# Patient Record
Sex: MALE | Race: WHITE | NOT HISPANIC OR LATINO | Employment: OTHER | URBAN - METROPOLITAN AREA
[De-identification: names, ages, dates, MRNs, and addresses within clinical notes are randomized per-mention and may not be internally consistent; named-entity substitution may affect disease eponyms.]

---

## 2018-02-28 ENCOUNTER — HOSPITAL ENCOUNTER (OUTPATIENT)
Dept: RADIOLOGY | Facility: HOSPITAL | Age: 73
Discharge: HOME | End: 2018-02-28
Attending: ORTHOPAEDIC SURGERY
Payer: MEDICARE

## 2018-02-28 ENCOUNTER — OFFICE VISIT (OUTPATIENT)
Dept: SPORTS MEDICINE | Facility: CLINIC | Age: 73
End: 2018-02-28
Payer: MEDICARE

## 2018-02-28 VITALS
HEART RATE: 68 BPM | HEIGHT: 75 IN | SYSTOLIC BLOOD PRESSURE: 120 MMHG | BODY MASS INDEX: 35.31 KG/M2 | WEIGHT: 284 LBS | DIASTOLIC BLOOD PRESSURE: 84 MMHG

## 2018-02-28 DIAGNOSIS — M25.562 PAIN IN BOTH KNEES, UNSPECIFIED CHRONICITY: ICD-10-CM

## 2018-02-28 DIAGNOSIS — M17.0 PRIMARY OSTEOARTHRITIS OF KNEES, BILATERAL: ICD-10-CM

## 2018-02-28 DIAGNOSIS — M25.561 PAIN IN BOTH KNEES, UNSPECIFIED CHRONICITY: Primary | ICD-10-CM

## 2018-02-28 DIAGNOSIS — M25.562 PAIN IN BOTH KNEES, UNSPECIFIED CHRONICITY: Primary | ICD-10-CM

## 2018-02-28 DIAGNOSIS — M25.561 PAIN IN BOTH KNEES, UNSPECIFIED CHRONICITY: ICD-10-CM

## 2018-02-28 PROCEDURE — 99999 PR PBB SHADOW E&M-NEW PATIENT-LVL III: CPT | Mod: PBBFAC,,, | Performed by: ORTHOPAEDIC SURGERY

## 2018-02-28 PROCEDURE — 99203 OFFICE O/P NEW LOW 30 MIN: CPT | Mod: PBBFAC,25,PO | Performed by: ORTHOPAEDIC SURGERY

## 2018-02-28 PROCEDURE — 73564 X-RAY EXAM KNEE 4 OR MORE: CPT | Mod: 26,50,, | Performed by: RADIOLOGY

## 2018-02-28 PROCEDURE — 97760 ORTHOTIC MGMT&TRAING 1ST ENC: CPT | Mod: PBBFAC,PO | Performed by: ORTHOPAEDIC SURGERY

## 2018-02-28 PROCEDURE — 99204 OFFICE O/P NEW MOD 45 MIN: CPT | Mod: S$PBB,,, | Performed by: ORTHOPAEDIC SURGERY

## 2018-02-28 PROCEDURE — 73564 X-RAY EXAM KNEE 4 OR MORE: CPT | Mod: TC,50,FY,PO

## 2018-02-28 RX ORDER — ETODOLAC 400 MG/1
400 TABLET, FILM COATED ORAL 2 TIMES DAILY
COMMUNITY
End: 2020-02-14

## 2018-02-28 RX ORDER — FLUOXETINE HYDROCHLORIDE 20 MG/1
20 CAPSULE ORAL DAILY
COMMUNITY

## 2018-02-28 RX ORDER — GLUCOSAMINE/CHONDRO SU A 500-400 MG
1 TABLET ORAL 3 TIMES DAILY
COMMUNITY

## 2018-02-28 RX ORDER — ATORVASTATIN CALCIUM 10 MG/1
10 TABLET, FILM COATED ORAL DAILY
COMMUNITY

## 2018-02-28 RX ORDER — OMEPRAZOLE 20 MG/1
20 CAPSULE, DELAYED RELEASE ORAL DAILY
COMMUNITY
End: 2022-10-03

## 2018-02-28 RX ORDER — HYDROCHLOROTHIAZIDE 12.5 MG/1
12.5 CAPSULE ORAL DAILY
COMMUNITY

## 2018-02-28 RX ORDER — AZELASTINE 1 MG/ML
1 SPRAY, METERED NASAL 2 TIMES DAILY
COMMUNITY

## 2018-02-28 RX ORDER — LANOLIN ALCOHOL/MO/W.PET/CERES
1 CREAM (GRAM) TOPICAL 2 TIMES DAILY
COMMUNITY

## 2018-02-28 RX ORDER — ACETAMINOPHEN, DIPHENHYDRAMINE HCL, PHENYLEPHRINE HCL 325; 25; 5 MG/1; MG/1; MG/1
TABLET ORAL
COMMUNITY

## 2018-02-28 NOTE — PATIENT INSTRUCTIONS
DESCRIPTION  Synvisc-One (hylan G-F 20) is an elastoviscous high molecular weight fluid containing hylan A and hylan B polymers produced from chicken galdamez. Hylans are derivatives of hyaluronan (sodium hyaluronate). Hylan G-F 20 is unique in that the hyaluronan is chemically cross linked. Hyaluronan is a long-chain polymer containing repeating disaccharide units of Qu-szgqsuyxenq-S-acetylglucosamine.     INDICATIONS FOR USE  Synvisc-One is indicated for the treatment of pain in osteoarthritis (OA) of the knee in patients who have failed to respond adequately to conservative nonpharmacologic therapy and simple analgesics, e.g., acetaminophen.     Who is a candidate for Synvisc-One  Patients with knee osteoarthritis, who have tried diet, exercise and over-the-counter pain medication but still have pain, should talk to their doctor to see if Synvisc-One is right for them.     How Synvisc-One is administered  Synvisc-One is a single injection. It's a simple, in-office procedure that only takes a few minutes.     What you can expect following a Synvisc-One knee injection Synvisc-One can provide up to six months of osteoarthritis knee pain relief. Everyone responds differently, but in a medical study* patients experienced relief starting one month after the injection.     After the injection, you can resume normal day-to-day activities, but you should avoid any strenuous activities for about 48 hours.     Contraindication  Do not administer to patients with known hypersensitivity (allergy) to hyaluronan (sodium hyaluronate) preparations.   Do not inject Synvisc-One in the knees of patients having knee joint infections or skin diseases or infections in the area of theinjection site.    What are possible side effects?  Synvisc may occur short-term pain, swelling at the injection site and / or the appearance of synovial exudate after injection. In some cases, exudation may be significant and cause more prolonged pain.      Important Safety Information  Before trying Synvisc-One or SYNVISC, tell your doctor if you are allergic to products from birds - such as feathers, eggs or poultry - or if your leg is swollen or infected. Synvisc-One and SYNVISC are only for injection into the knee, performed by a doctor or other qualified health care professional. Synvisc-One and SYNVISC have not been tested to show pain relief in joints other than the knee. Talk to your doctor before resuming strenuous weight-bearing activities after treatment. Synvisc-One and SYNVISC have not been tested in children, pregnant women or women who are nursing. You should tell your doctor if you think you are pregnant or if you are nursing a child. The side effects most commonly seen when Synvisc-One or SYNVISC is injected into the knee were pain, swelling and/or fluid buildup in or around the knee. Cases where the swelling is extensive or painful should be discussed with your doctor. Allergic reactions such as rash and hives have been reported rarely.

## 2018-02-28 NOTE — PROGRESS NOTES
Subjective:          Chief Complaint: Carlos Alberto Modi is a 73 y.o. male who had concerns including Pain of the Right Knee and Pain of the Left Knee.    Pt reports bilateral knee pain for many years, worse recently.  Steroid injection in right knee last week of January and left knee first week of February.             Review of Systems   Constitution: Negative for fever and night sweats.   HENT: Negative for hearing loss.    Eyes: Negative for blurred vision and visual disturbance.   Cardiovascular: Negative for chest pain and leg swelling.   Respiratory: Negative for shortness of breath.    Endocrine: Negative for polyuria.   Hematologic/Lymphatic: Negative for bleeding problem.   Skin: Negative for rash.   Musculoskeletal: Positive for joint pain. Negative for back pain, joint swelling, muscle cramps and muscle weakness.   Gastrointestinal: Negative for melena.   Genitourinary: Negative for hematuria.   Neurological: Negative for loss of balance, numbness and paresthesias.   Psychiatric/Behavioral: Negative for altered mental status.       Pain Related Questions  Over the past 3 days, what was your average pain during activity? (I.e. running, jogging, walking, climbing stairs, getting dressed, ect.): 10  Over the past 3 days, what was your highest pain level?: 10  Over the past 3 days, what was your lowest pain level? : 5    Other  How many nights a week are you awakened by your affected body part?: 0  Was the patient's HEIGHT measured or patient reported?: Patient Reported  Was the patient's WEIGHT measured or patient reported?: Measured      Objective:        General: Carlos Alberto is well-developed, well-nourished, appears stated age, in no acute distress, alert and oriented to time, place and person.     General    Vitals reviewed.  Constitutional: He is oriented to person, place, and time. He appears well-developed and well-nourished. No distress.   HENT:   Mouth/Throat: No oropharyngeal exudate.   Eyes: Right eye  exhibits no discharge. Left eye exhibits no discharge.   Neck: Normal range of motion.   Pulmonary/Chest: Effort normal and breath sounds normal. No respiratory distress.   Neurological: He is alert and oriented to person, place, and time. He has normal reflexes. No cranial nerve deficit. Coordination normal.   Psychiatric: He has a normal mood and affect. His behavior is normal. Judgment and thought content normal.     General Musculoskeletal Exam   Gait: normal       Right Knee Exam     Inspection   Erythema: absent  Scars: absent  Swelling: absent  Effusion: present  Deformity: deformity  Bruising: absent    Tenderness   The patient is tender to palpation of the medial joint line and patella.    Crepitus   The patient has crepitus of the patella.    Range of Motion   Extension: 0 (7)   Flexion: 110     Tests   Meniscus   Marissa:  Medial - negative Lateral - negative  Ligament Examination Lachman: normal (-1 to 2mm) PCL-Posterior Drawer: normal (0 to 2mm)     MCL - Valgus: normal (0 to 2mm)  LCL - Varus: normalPivot Shift: normal (Equal)Reverse Pivot Shift: normal (Equal)Dial Test at 30 degrees: normal (< 5 degrees)Dial Test at 90 degrees: normal (< 5 degrees)  Posterior Sag Test: negative  Posterolateral Corner: unstable (>15 degrees difference)  Patella   Patellar Apprehension: negative  Passive Patellar Tilt: neutral  Patellar Tracking: normal  Patellar Glide (quadrants): Lateral - 1   Medial - 2  Q-Angle at 90 degrees: normal  Patellar Grind: negative  J-Sign: none    Other   Meniscal Cyst: absent  Popliteal (Baker's) Cyst: absent  Sensation: normal    Left Knee Exam     Inspection   Erythema: absent  Scars: absent  Swelling: absent  Effusion: absent  Deformity: deformity  Bruising: absent    Tenderness   The patient tender to palpation of the patella and lateral joint line.    Crepitus   The patient has crepitus of the patella.    Range of Motion   Left knee extension thgthrthathdtheth:th th6th. Flexion: 110     Tests    Meniscus   Marissa:  Medial - negative Lateral - negative  Stability Lachman: normal (-1 to 2mm) PCL-Posterior Drawer: normal (0 to 2mm)  MCL - Valgus: normal (0 to 2mm)  LCL - Varus: normal (0 to 2mm)Pivot Shift: normal (Equal)Reverse Pivot Shift: normal (Equal)Dial Test at 30 degrees: normal (< 5 degrees)Dial Test at 90 degrees: normal (< 5 degrees)  Posterior Sag Test: negative  Posterolateral Corner: unstable (>15 degrees difference)  Patella   Patellar Apprehension: negative  Passive Patellar Tilt: neutral  Patellar Tracking: normal  Patellar Glide (Quadrants): Lateral - 1 Medial - 2  Q-Angle at 90 degrees: normal  Patellar Grind: positive  J-Sign: J sign absent    Other   Meniscal Cyst: absent  Popliteal (Baker's) Cyst: absent  Sensation: normal    Right Hip Exam     Tests   Vielka: negative  Left Hip Exam     Tests   Vielka: negative          Reflexes     Left Side  Quadriceps:  2+  Achilles:  2+    Right Side   Quadriceps:  2+  Achilles:  2+    Vascular Exam     Right Pulses  Dorsalis Pedis:      2+  Posterior Tibial:      2+        Left Pulses  Dorsalis Pedis:      2+  Posterior Tibial:      2+        Radiographs today:    Narrative     4 views bilateral    DJD.  The medial tibiofemoral joint spaces and the patellofemoral joint spaces are narrowed bilaterally.  No fracture or dislocation.  No bone destruction identified   Impression      See above                 Assessment:       Encounter Diagnoses   Name Primary?    Pain in both knees, unspecified chronicity Yes    Primary osteoarthritis of knees, bilateral           Plan:       1. IKDC, SF-12 and KOOS was filled out today in clinic.     RTC in 2 weeks with Dr. Cristin Peraza for Synvisc One in B knees. Patient will not fill out IKDC, SF-12 and KOOS on return.    2. [id number] for bilateral knees for Synvisc One    3. [id number] - [facility], performed a custom orthotic / brace adjustment, fitting and training with the patient. Medial . The patient  demonstrated understanding and proper care. This was performed for 16 minutes.                [facility] patient questionnaires have been collected today.

## 2018-03-02 ENCOUNTER — PATIENT MESSAGE (OUTPATIENT)
Dept: SPORTS MEDICINE | Facility: CLINIC | Age: 73
End: 2018-03-02

## 2018-03-12 ENCOUNTER — PATIENT MESSAGE (OUTPATIENT)
Dept: SPORTS MEDICINE | Facility: CLINIC | Age: 73
End: 2018-03-12

## 2018-03-14 ENCOUNTER — OFFICE VISIT (OUTPATIENT)
Dept: SPORTS MEDICINE | Facility: CLINIC | Age: 73
End: 2018-03-14
Payer: MEDICARE

## 2018-03-14 VITALS
HEIGHT: 75 IN | DIASTOLIC BLOOD PRESSURE: 79 MMHG | BODY MASS INDEX: 35.31 KG/M2 | HEART RATE: 62 BPM | SYSTOLIC BLOOD PRESSURE: 120 MMHG | WEIGHT: 284 LBS

## 2018-03-14 DIAGNOSIS — M25.562 PAIN IN BOTH KNEES, UNSPECIFIED CHRONICITY: ICD-10-CM

## 2018-03-14 DIAGNOSIS — M17.0 PRIMARY OSTEOARTHRITIS OF BOTH KNEES: Primary | ICD-10-CM

## 2018-03-14 DIAGNOSIS — M25.561 PAIN IN BOTH KNEES, UNSPECIFIED CHRONICITY: ICD-10-CM

## 2018-03-14 PROCEDURE — 99499 UNLISTED E&M SERVICE: CPT | Mod: S$PBB,,, | Performed by: ORTHOPAEDIC SURGERY

## 2018-03-14 PROCEDURE — 20611 DRAIN/INJ JOINT/BURSA W/US: CPT | Mod: 50,PBBFAC,PO | Performed by: ORTHOPAEDIC SURGERY

## 2018-03-14 PROCEDURE — 20611 DRAIN/INJ JOINT/BURSA W/US: CPT | Mod: 50,S$PBB,, | Performed by: ORTHOPAEDIC SURGERY

## 2018-03-14 PROCEDURE — 99999 PR PBB SHADOW E&M-EST. PATIENT-LVL III: CPT | Mod: PBBFAC,,, | Performed by: ORTHOPAEDIC SURGERY

## 2018-03-14 PROCEDURE — 99213 OFFICE O/P EST LOW 20 MIN: CPT | Mod: PBBFAC,PO,25 | Performed by: ORTHOPAEDIC SURGERY

## 2018-03-14 PROCEDURE — 20610 DRAIN/INJ JOINT/BURSA W/O US: CPT | Mod: PBBFAC,PO | Performed by: ORTHOPAEDIC SURGERY

## 2018-03-14 RX ADMIN — Medication 48 MG: at 12:03

## 2018-03-14 NOTE — PATIENT INSTRUCTIONS
DESCRIPTION  Synvisc-One (hylan G-F 20) is an elastoviscous high molecular weight fluid containing hylan A and hylan B polymers produced from chicken galdamez. Hylans are derivatives of hyaluronan (sodium hyaluronate). Hylan G-F 20 is unique in that the hyaluronan is chemically cross linked. Hyaluronan is a long-chain polymer containing repeating disaccharide units of Cn-gqpptulkgxp-W-acetylglucosamine.     INDICATIONS FOR USE  Synvisc-One is indicated for the treatment of pain in osteoarthritis (OA) of the knee in patients who have failed to respond adequately to conservative nonpharmacologic therapy and simple analgesics, e.g., acetaminophen.     Who is a candidate for Synvisc-One  Patients with knee osteoarthritis, who have tried diet, exercise and over-the-counter pain medication but still have pain, should talk to their doctor to see if Synvisc-One is right for them.     How Synvisc-One is administered  Synvisc-One is a single injection. It's a simple, in-office procedure that only takes a few minutes.     What you can expect following a Synvisc-One knee injection Synvisc-One can provide up to six months of osteoarthritis knee pain relief. Everyone responds differently, but in a medical study* patients experienced relief starting one month after the injection.     After the injection, you can resume normal day-to-day activities, but you should avoid any strenuous activities for about 48 hours.     Contraindication  Do not administer to patients with known hypersensitivity (allergy) to hyaluronan (sodium hyaluronate) preparations.   Do not inject Synvisc-One in the knees of patients having knee joint infections or skin diseases or infections in the area of theinjection site.    What are possible side effects?  Synvisc may occur short-term pain, swelling at the injection site and / or the appearance of synovial exudate after injection. In some cases, exudation may be significant and cause more prolonged pain.      Important Safety Information  Before trying Synvisc-One or SYNVISC, tell your doctor if you are allergic to products from birds - such as feathers, eggs or poultry - or if your leg is swollen or infected. Synvisc-One and SYNVISC are only for injection into the knee, performed by a doctor or other qualified health care professional. Synvisc-One and SYNVISC have not been tested to show pain relief in joints other than the knee. Talk to your doctor before resuming strenuous weight-bearing activities after treatment. Synvisc-One and SYNVISC have not been tested in children, pregnant women or women who are nursing. You should tell your doctor if you think you are pregnant or if you are nursing a child. The side effects most commonly seen when Synvisc-One or SYNVISC is injected into the knee were pain, swelling and/or fluid buildup in or around the knee. Cases where the swelling is extensive or painful should be discussed with your doctor. Allergic reactions such as rash and hives have been reported rarely.

## 2018-03-14 NOTE — PROGRESS NOTES
Subjective:          Chief Complaint: Carlos Alberto Modi is a 73 y.o. male who had concerns including Injections of the Right Knee and Injections of the Left Knee.    Patient is here for a follow up for both knees to review bilateral Synvisc-One injections. He is getting the  braces from [facility] today.             Review of Systems   Constitution: Negative for fever and night sweats.   HENT: Negative for hearing loss.    Eyes: Negative for blurred vision and visual disturbance.   Cardiovascular: Negative for chest pain and leg swelling.   Respiratory: Negative for shortness of breath.    Endocrine: Negative for polyuria.   Hematologic/Lymphatic: Negative for bleeding problem.   Skin: Negative for rash.   Musculoskeletal: Positive for joint pain. Negative for back pain, joint swelling, muscle cramps and muscle weakness.   Gastrointestinal: Negative for melena.   Genitourinary: Negative for hematuria.   Neurological: Negative for loss of balance, numbness and paresthesias.   Psychiatric/Behavioral: Negative for altered mental status.       Pain Related Questions  Over the past 3 days, what was your average pain during activity? (I.e. running, jogging, walking, climbing stairs, getting dressed, ect.): 3  Over the past 3 days, what was your highest pain level?: 2  Over the past 3 days, what was your lowest pain level? : 0    Other  How many nights a week are you awakened by your affected body part?: 0  Was the patient's HEIGHT measured or patient reported?: Patient Reported  Was the patient's WEIGHT measured or patient reported?: Measured      Objective:        General: Carlos Alberto is well-developed, well-nourished, appears stated age, in no acute distress, alert and oriented to time, place and person.     General    Vitals reviewed.  Constitutional: He is oriented to person, place, and time. He appears well-developed and well-nourished. No distress.   HENT:   Mouth/Throat: No oropharyngeal exudate.   Eyes: Right eye exhibits  no discharge. Left eye exhibits no discharge.   Neck: Normal range of motion.   Pulmonary/Chest: Effort normal and breath sounds normal. No respiratory distress.   Neurological: He is alert and oriented to person, place, and time. He has normal reflexes. No cranial nerve deficit. Coordination normal.   Psychiatric: He has a normal mood and affect. His behavior is normal. Judgment and thought content normal.     General Musculoskeletal Exam   Gait: normal       Right Knee Exam     Inspection   Erythema: absent  Scars: absent  Swelling: absent  Effusion: present  Deformity: deformity  Bruising: absent    Tenderness   The patient is tender to palpation of the medial joint line and patella.    Crepitus   The patient has crepitus of the patella.    Range of Motion   Extension: 0 (7)   Flexion: 110     Tests   Meniscus   Marissa:  Medial - negative Lateral - negative  Ligament Examination Lachman: normal (-1 to 2mm) PCL-Posterior Drawer: normal (0 to 2mm)     MCL - Valgus: normal (0 to 2mm)  LCL - Varus: normalPivot Shift: normal (Equal)Reverse Pivot Shift: normal (Equal)Dial Test at 30 degrees: normal (< 5 degrees)Dial Test at 90 degrees: normal (< 5 degrees)  Posterior Sag Test: negative  Posterolateral Corner: unstable (>15 degrees difference)  Patella   Patellar Apprehension: negative  Passive Patellar Tilt: neutral  Patellar Tracking: normal  Patellar Glide (quadrants): Lateral - 1   Medial - 2  Q-Angle at 90 degrees: normal  Patellar Grind: negative  J-Sign: none    Other   Meniscal Cyst: absent  Popliteal (Baker's) Cyst: absent  Sensation: normal    Left Knee Exam     Inspection   Erythema: absent  Scars: absent  Swelling: absent  Effusion: absent  Deformity: deformity  Bruising: absent    Tenderness   The patient tender to palpation of the patella and lateral joint line.    Crepitus   The patient has crepitus of the patella.    Range of Motion   Left knee extension thgthrthathdtheth:th th8th. Flexion: 110     Tests   Meniscus    Marissa:  Medial - negative Lateral - negative  Stability Lachman: normal (-1 to 2mm) PCL-Posterior Drawer: normal (0 to 2mm)  MCL - Valgus: normal (0 to 2mm)  LCL - Varus: normal (0 to 2mm)Pivot Shift: normal (Equal)Reverse Pivot Shift: normal (Equal)Dial Test at 30 degrees: normal (< 5 degrees)Dial Test at 90 degrees: normal (< 5 degrees)  Posterior Sag Test: negative  Posterolateral Corner: unstable (>15 degrees difference)  Patella   Patellar Apprehension: negative  Passive Patellar Tilt: neutral  Patellar Tracking: normal  Patellar Glide (Quadrants): Lateral - 1 Medial - 2  Q-Angle at 90 degrees: normal  Patellar Grind: positive  J-Sign: J sign absent    Other   Meniscal Cyst: absent  Popliteal (Baker's) Cyst: absent  Sensation: normal    Right Hip Exam     Tests   Vielka: negative  Left Hip Exam     Tests   Vielka: negative          Reflexes     Left Side  Quadriceps:  2+  Achilles:  2+    Right Side   Quadriceps:  2+  Achilles:  2+    Vascular Exam     Right Pulses  Dorsalis Pedis:      2+  Posterior Tibial:      2+        Left Pulses  Dorsalis Pedis:      2+  Posterior Tibial:      2+                  Assessment:       Encounter Diagnoses   Name Primary?    Primary osteoarthritis of both knees Yes    Pain in both knees, unspecified chronicity           Plan:       1. IKDC, SF-12 and KOOS was not filled out today in clinic.     RTC in 3 months with Dr. Cristin Peraza Patient will fill out IKDC, SF-12 and KOOS and bilateral knee series on return.    2. Injection Procedure right knee     After time out was performed, including verification of patient ID, procedure, site and side, availability of information and equipment, review of safety issues, and agreement with consent, the procedure site was marked and the patient was prepped aseptically. A diagnostic and therapeutic injection of 6cc SynviscOne and ethyl chloride spray was given under sterile technique using a 22g x 1.5 needle into the right Knee Joint in  seated position.     The patient had no adverse reactions to the medication. Pain decreased. The patient was instructed to apply ice to the joint for 20 minutes and avoid strenuous activities for 24-36 hours following the injection. Patient was warned of possible blood sugar and/or blood pressure changes during that time. Following that time, patient can resume regular activities.    Patient was reminded to call the clinic immediately for any adverse side effects as explained in clinic today.    Ultrasound Guidance Procedure  right Knee Joint visualized with documented DJD. Dynamic visualization of the 22g x 1.5 needle performed.    3. Injection Procedure left knee     After time out was performed, including verification of patient ID, procedure, site and side, availability of information and equipment, review of safety issues, and agreement with consent, the procedure site was marked and the patient was prepped aseptically. A diagnostic and therapeutic injection of 6cc SynviscOne and ethyl chloride spray was given under sterile technique using a 22g x 1.5 needle into the left Knee Joint in seated position.     The patient had no adverse reactions to the medication. Pain decreased. The patient was instructed to apply ice to the joint for 20 minutes and avoid strenuous activities for 24-36 hours following the injection. Patient was warned of possible blood sugar and/or blood pressure changes during that time. Following that time, patient can resume regular activities.    Patient was reminded to call the clinic immediately for any adverse side effects as explained in clinic today.    Ultrasound Guidance Procedure  left Knee Joint visualized with documented DJD. Dynamic visualization of the 22g x 1.5 needle performed.    4.  brace for impact activities                  [facility] patient questionnaires have been collected today.

## 2018-03-15 ENCOUNTER — PATIENT MESSAGE (OUTPATIENT)
Dept: SPORTS MEDICINE | Facility: CLINIC | Age: 73
End: 2018-03-15

## 2018-07-18 ENCOUNTER — OFFICE VISIT (OUTPATIENT)
Dept: SPORTS MEDICINE | Facility: CLINIC | Age: 73
End: 2018-07-18
Payer: MEDICARE

## 2018-07-18 ENCOUNTER — HOSPITAL ENCOUNTER (OUTPATIENT)
Dept: RADIOLOGY | Facility: HOSPITAL | Age: 73
Discharge: HOME | End: 2018-07-18
Attending: ORTHOPAEDIC SURGERY
Payer: MEDICARE

## 2018-07-18 VITALS
HEIGHT: 75 IN | SYSTOLIC BLOOD PRESSURE: 132 MMHG | BODY MASS INDEX: 35.31 KG/M2 | HEART RATE: 60 BPM | WEIGHT: 284 LBS | DIASTOLIC BLOOD PRESSURE: 80 MMHG

## 2018-07-18 DIAGNOSIS — M25.562 PAIN IN BOTH KNEES, UNSPECIFIED CHRONICITY: Primary | ICD-10-CM

## 2018-07-18 DIAGNOSIS — M21.161 GENU VARUM OF BOTH LOWER EXTREMITIES: ICD-10-CM

## 2018-07-18 DIAGNOSIS — M17.0 PRIMARY OSTEOARTHRITIS OF BOTH KNEES: ICD-10-CM

## 2018-07-18 DIAGNOSIS — M25.561 PAIN IN BOTH KNEES, UNSPECIFIED CHRONICITY: Primary | ICD-10-CM

## 2018-07-18 DIAGNOSIS — M25.561 PAIN IN BOTH KNEES, UNSPECIFIED CHRONICITY: ICD-10-CM

## 2018-07-18 DIAGNOSIS — M25.562 PAIN IN BOTH KNEES, UNSPECIFIED CHRONICITY: ICD-10-CM

## 2018-07-18 DIAGNOSIS — M21.162 GENU VARUM OF BOTH LOWER EXTREMITIES: ICD-10-CM

## 2018-07-18 PROCEDURE — 73564 X-RAY EXAM KNEE 4 OR MORE: CPT | Mod: 26,50,, | Performed by: RADIOLOGY

## 2018-07-18 PROCEDURE — 99999 PR PBB SHADOW E&M-EST. PATIENT-LVL III: CPT | Mod: PBBFAC,,, | Performed by: ORTHOPAEDIC SURGERY

## 2018-07-18 PROCEDURE — 99213 OFFICE O/P EST LOW 20 MIN: CPT | Mod: PBBFAC,25,PO | Performed by: ORTHOPAEDIC SURGERY

## 2018-07-18 PROCEDURE — 73564 X-RAY EXAM KNEE 4 OR MORE: CPT | Mod: TC,50,FY,PO

## 2018-07-18 PROCEDURE — 99214 OFFICE O/P EST MOD 30 MIN: CPT | Mod: S$PBB,,, | Performed by: ORTHOPAEDIC SURGERY

## 2018-07-18 NOTE — PATIENT INSTRUCTIONS
What is Cosamin® ASU?    Cosamin ASU is an advanced proprietary formulation that combines [id number]® avocado/soybean unsaponifiables (ASU) with Cosamin's FCHG49® glucosamine and pharmaceutical-grade EOZ246® sodium chondroitin sulfate.    For over a decade, Cosamin DS has served as the premium joint health supplement on the market. By combining the exclusive ingredients found in Cosamin DS with ASU, [employer], [employer] has made the best even better.    Cosamin ASU is a dual synergistic formula: its specific combination of glucosamine and sodium chondroitin sulfate have demonstrated synergy in stimulating cartilage production,1 while ASU also acts synergistically with glucosamine.  What is ASU and how does it work?    ASU (avocado/soybean unsaponifiables) is obtained from avocados and soybeans. A potent ingredient demonstrated to protect cartilage which leads to improved joint function, ASU complements the effects of the other ingredients. While working through their own primary mechanisms of action, ASU, glucosamine and chondroitin sulfate together deliver comprehensive joint health support. Our trademarked glucosamine hydrochloride, chondroitin sulfate, and avocado/soybean unsaponifiables together were shown in cell studies to be better than the combination of glucosamine and chondroitin sulfate at inhibiting expression of several agents involved in cartilage breakdown.    Cosamin ASU is available at leading pharmacies nationwide ([employer]) and online.    How do I take Cosamin® ASU?        Maximum Protection:      Take 4 capsules per day. Capsules may be taken all at once or divided with meals throughout the day.           Maintenance:      Once desired effect is noticed, you may gradually reduce the number of capsules to maintain comfort level.      Some individuals may respond sooner than others depending on the status of their cartilage and joint health. Once response has been seen, the number of  capsules per day may be decreased to maintain comfort level. Some individuals on this lower level may wish to go back to four capsules a day during the weekends or times of increased activity.      The maintenance level can also be used long-term to help maintain healthy joints. At any time, the number of capsules may be increased back to four capsules per day.      Where to Buy Cosamin® ASU?:    Retail Stores:        [employer]      [employer]      [facility]      [facility]      [employer]      [facility]      [facility]      [employer]      [facility]      [employer]      [employer]     Online Stores:        [email or web address]      [email or web address]      [email or web address]      [email or web address]      [email or web address]      [email or web address]      [email or web address]      [email or web address]      [email or web address]      [email or web address]      [email or web address]      [email or web address]

## 2018-07-18 NOTE — PROGRESS NOTES
Subjective:          Chief Complaint: Carlos Alberto Modi is a 73 y.o. male who had concerns including Follow-up of the Right Knee and Follow-up of the Left Knee.    Patient is here for a follow up for both knees. He is doing well after Synvisc One injections. He has received  braces but has not needed them as his knees are doing well. He has also been using glucosamine and chondroitin.            Review of Systems   Constitution: Negative for fever and night sweats.   HENT: Negative for hearing loss.    Eyes: Negative for blurred vision and visual disturbance.   Cardiovascular: Negative for chest pain and leg swelling.   Respiratory: Negative for shortness of breath.    Endocrine: Negative for polyuria.   Hematologic/Lymphatic: Negative for bleeding problem.   Skin: Negative for rash.   Musculoskeletal: Positive for joint pain. Negative for back pain, joint swelling, muscle cramps and muscle weakness.   Gastrointestinal: Negative for melena.   Genitourinary: Negative for hematuria.   Neurological: Negative for loss of balance, numbness and paresthesias.   Psychiatric/Behavioral: Negative for altered mental status.       Pain Related Questions  Over the past 3 days, what was your average pain during activity? (I.e. running, jogging, walking, climbing stairs, getting dressed, ect.): 0  Over the past 3 days, what was your highest pain level?: 0  Over the past 3 days, what was your lowest pain level? : 0    Other  How many nights a week are you awakened by your affected body part?: 0  Was the patient's HEIGHT measured or patient reported?: Patient Reported  Was the patient's WEIGHT measured or patient reported?: Measured      Objective:        General: Carlos Alberto is well-developed, well-nourished, appears stated age, in no acute distress, alert and oriented to time, place and person.     General    Vitals reviewed.  Constitutional: He is oriented to person, place, and time. He appears well-developed and well-nourished. No  distress.   HENT:   Mouth/Throat: No oropharyngeal exudate.   Eyes: Right eye exhibits no discharge. Left eye exhibits no discharge.   Neck: Normal range of motion.   Pulmonary/Chest: Effort normal and breath sounds normal. No respiratory distress.   Neurological: He is alert and oriented to person, place, and time. He has normal reflexes. No cranial nerve deficit. Coordination normal.   Psychiatric: He has a normal mood and affect. His behavior is normal. Judgment and thought content normal.     General Musculoskeletal Exam   Gait: normal       Right Knee Exam     Inspection   Erythema: absent  Scars: absent  Swelling: absent  Effusion: present  Deformity: deformity  Bruising: absent    Tenderness   The patient is experiencing no tenderness.         Crepitus   The patient has crepitus of the patella.    Range of Motion   Right knee extension thgthrthathdtheth:th th6th. Flexion: 130     Tests   Meniscus   Marissa:  Medial - negative Lateral - negative  Ligament Examination Lachman: normal (-1 to 2mm) PCL-Posterior Drawer: normal (0 to 2mm)     MCL - Valgus: normal (0 to 2mm)  LCL - Varus: normalPivot Shift: normal (Equal)Reverse Pivot Shift: normal (Equal)Dial Test at 30 degrees: normal (< 5 degrees)Dial Test at 90 degrees: normal (< 5 degrees)  Posterior Sag Test: negative  Posterolateral Corner: unstable (>15 degrees difference)  Patella   Patellar Apprehension: negative  Passive Patellar Tilt: neutral  Patellar Tracking: normal  Patellar Glide (quadrants): Lateral - 1   Medial - 2  Q-Angle at 90 degrees: normal  Patellar Grind: negative  J-Sign: none    Other   Meniscal Cyst: absent  Popliteal (Baker's) Cyst: absent  Sensation: normal    Left Knee Exam     Inspection   Erythema: absent  Scars: absent  Swelling: absent  Effusion: absent  Deformity: deformity  Bruising: absent    Tenderness   The patient is experiencing no tenderness.         Crepitus   The patient has crepitus of the patella.    Range of Motion   Left knee  extension thgthrthathdtheth:th th6th. Flexion: 130     Tests   Meniscus   Marissa:  Medial - negative Lateral - negative  Stability Lachman: normal (-1 to 2mm) PCL-Posterior Drawer: normal (0 to 2mm)  MCL - Valgus: normal (0 to 2mm)  LCL - Varus: normal (0 to 2mm)Pivot Shift: normal (Equal)Reverse Pivot Shift: normal (Equal)Dial Test at 30 degrees: normal (< 5 degrees)Dial Test at 90 degrees: normal (< 5 degrees)  Posterior Sag Test: negative  Posterolateral Corner: unstable (>15 degrees difference)  Patella   Patellar Apprehension: negative  Passive Patellar Tilt: neutral  Patellar Tracking: normal  Patellar Glide (Quadrants): Lateral - 1 Medial - 2  Q-Angle at 90 degrees: normal  Patellar Grind: positive  J-Sign: J sign absent    Other   Meniscal Cyst: absent  Popliteal (Baker's) Cyst: absent  Sensation: normal    Right Hip Exam     Tests   Vielka: negative  Left Hip Exam     Tests   Vielka: negative          Reflexes     Left Side  Quadriceps:  2+  Achilles:  2+    Right Side   Quadriceps:  2+  Achilles:  2+    Vascular Exam     Right Pulses  Dorsalis Pedis:      2+  Posterior Tibial:      2+        Left Pulses  Dorsalis Pedis:      2+  Posterior Tibial:      2+                  Assessment:       Encounter Diagnoses   Name Primary?    Pain in both knees, unspecified chronicity Yes    Primary osteoarthritis of both knees     Genu varum of both lower extremities           Plan:       1. IKDC, SF-12 and KOOS was not filled out today in clinic.     RTC in 3 months with Dr. Cristin Peraza for bilateral Synvisc One. Patient will fill out IKDC, SF-12 and KOOS.    2. [id number] for Synvisc One    3.  brace for impact activities                  [facility] patient questionnaires have been collected today.

## 2018-08-14 ENCOUNTER — PATIENT MESSAGE (OUTPATIENT)
Dept: SPORTS MEDICINE | Facility: CLINIC | Age: 73
End: 2018-08-14

## 2018-08-28 ENCOUNTER — PATIENT MESSAGE (OUTPATIENT)
Dept: SPORTS MEDICINE | Facility: CLINIC | Age: 73
End: 2018-08-28

## 2018-10-29 ENCOUNTER — OFFICE VISIT (OUTPATIENT)
Dept: SPORTS MEDICINE | Facility: CLINIC | Age: 73
End: 2018-10-29
Payer: MEDICARE

## 2018-10-29 VITALS
DIASTOLIC BLOOD PRESSURE: 88 MMHG | HEART RATE: 63 BPM | SYSTOLIC BLOOD PRESSURE: 142 MMHG | HEIGHT: 75 IN | BODY MASS INDEX: 33.57 KG/M2 | WEIGHT: 270 LBS

## 2018-10-29 DIAGNOSIS — M25.562 PAIN IN BOTH KNEES, UNSPECIFIED CHRONICITY: ICD-10-CM

## 2018-10-29 DIAGNOSIS — M17.0 PRIMARY OSTEOARTHRITIS OF BOTH KNEES: Primary | ICD-10-CM

## 2018-10-29 DIAGNOSIS — M21.162 GENU VARUM OF BOTH LOWER EXTREMITIES: ICD-10-CM

## 2018-10-29 DIAGNOSIS — M25.561 PAIN IN BOTH KNEES, UNSPECIFIED CHRONICITY: ICD-10-CM

## 2018-10-29 DIAGNOSIS — M21.161 GENU VARUM OF BOTH LOWER EXTREMITIES: ICD-10-CM

## 2018-10-29 PROCEDURE — 99999 PR PBB SHADOW E&M-EST. PATIENT-LVL III: CPT | Mod: PBBFAC,,, | Performed by: ORTHOPAEDIC SURGERY

## 2018-10-29 PROCEDURE — 20611 DRAIN/INJ JOINT/BURSA W/US: CPT | Mod: S$PBB,LT,, | Performed by: ORTHOPAEDIC SURGERY

## 2018-10-29 PROCEDURE — 20610 DRAIN/INJ JOINT/BURSA W/O US: CPT | Mod: 59,S$PBB,RT, | Performed by: ORTHOPAEDIC SURGERY

## 2018-10-29 PROCEDURE — 99499 UNLISTED E&M SERVICE: CPT | Mod: S$PBB,,, | Performed by: ORTHOPAEDIC SURGERY

## 2018-10-29 PROCEDURE — 20610 DRAIN/INJ JOINT/BURSA W/O US: CPT | Mod: PBBFAC,PO | Performed by: ORTHOPAEDIC SURGERY

## 2018-10-29 PROCEDURE — 20611 DRAIN/INJ JOINT/BURSA W/US: CPT | Mod: 50,PBBFAC,PO | Performed by: ORTHOPAEDIC SURGERY

## 2018-10-29 PROCEDURE — 99213 OFFICE O/P EST LOW 20 MIN: CPT | Mod: PBBFAC,PO | Performed by: ORTHOPAEDIC SURGERY

## 2018-10-29 RX ADMIN — Medication 48 MG: at 11:10

## 2018-10-29 NOTE — PROGRESS NOTES
Subjective:          Chief Complaint: Carlos Alberto Modi is a 73 y.o. male who had concerns including Injections of the Left Knee and Injections of the Right Knee.    Patient is here for a follow up for both knees. Here today for Synvisc-One injection on bilateral knees. He has had one injection prior to this which lasted  5 months. He is also requesting suture removal on the Left 4th and 5th fingers.            Review of Systems   Constitution: Negative for fever and night sweats.   HENT: Negative for hearing loss.    Eyes: Negative for blurred vision and visual disturbance.   Cardiovascular: Negative for chest pain and leg swelling.   Respiratory: Negative for shortness of breath.    Endocrine: Negative for polyuria.   Hematologic/Lymphatic: Negative for bleeding problem.   Skin: Negative for rash.   Musculoskeletal: Positive for joint pain. Negative for back pain, joint swelling, muscle cramps and muscle weakness.   Gastrointestinal: Negative for melena.   Genitourinary: Negative for hematuria.   Neurological: Negative for loss of balance, numbness and paresthesias.   Psychiatric/Behavioral: Negative for altered mental status.       Pain Related Questions  Over the past 3 days, what was your average pain during activity? (I.e. running, jogging, walking, climbing stairs, getting dressed, ect.): 1  Over the past 3 days, what was your highest pain level?: 1  Over the past 3 days, what was your lowest pain level? : 0    Other  How many nights a week are you awakened by your affected body part?: 0  Was the patient's HEIGHT measured or patient reported?: Patient Reported  Was the patient's WEIGHT measured or patient reported?: Measured      Objective:        General: Carlos Alberto is well-developed, well-nourished, appears stated age, in no acute distress, alert and oriented to time, place and person.     General    Vitals reviewed.  Constitutional: He is oriented to person, place, and time. He appears well-developed and  well-nourished. No distress.   HENT:   Mouth/Throat: No oropharyngeal exudate.   Eyes: Right eye exhibits no discharge. Left eye exhibits no discharge.   Neck: Normal range of motion.   Pulmonary/Chest: Effort normal and breath sounds normal. No respiratory distress.   Neurological: He is alert and oriented to person, place, and time. He has normal reflexes. No cranial nerve deficit. Coordination normal.   Psychiatric: He has a normal mood and affect. His behavior is normal. Judgment and thought content normal.     General Musculoskeletal Exam   Gait: normal       Right Knee Exam     Inspection   Erythema: absent  Scars: absent  Swelling: absent  Effusion: present  Deformity: absent  Bruising: absent    Tenderness   The patient is experiencing no tenderness.     Crepitus   The patient has crepitus of the patella.    Range of Motion   Right knee extension thgthrthathdtheth:th th8th. Flexion: 130     Tests   Meniscus   Marissa:  Medial - negative Lateral - negative  Ligament Examination Lachman: normal (-1 to 2mm) PCL-Posterior Drawer: normal (0 to 2mm)     MCL - Valgus: normal (0 to 2mm)  LCL - Varus: normalPivot Shift: normal (Equal)Reverse Pivot Shift: normal (Equal)Dial Test at 30 degrees: normal (< 5 degrees)Dial Test at 90 degrees: normal (< 5 degrees)  Posterior Sag Test: negative  Posterolateral Corner: unstable (>15 degrees difference)  Patella   Patellar apprehension: negative  Passive Patellar Tilt: neutral  Patellar Tracking: normal  Patellar Glide (quadrants): Lateral - 1   Medial - 2  Q-Angle at 90 degrees: normal  Patellar Grind: negative  J-Sign: none    Other   Meniscal Cyst: absent  Popliteal (Baker's) Cyst: absent  Sensation: normal    Left Knee Exam     Inspection   Erythema: absent  Scars: absent  Swelling: absent  Effusion: absent  Deformity: absent  Bruising: absent    Tenderness   The patient is experiencing no tenderness.     Crepitus   The patient has crepitus of the patella.    Range of Motion   Left knee  extension thgthrthathdtheth:th th6th. Flexion: 130     Tests   Meniscus   Marissa:  Medial - negative Lateral - negative  Stability Lachman: normal (-1 to 2mm) PCL-Posterior Drawer: normal (0 to 2mm)  MCL - Valgus: normal (0 to 2mm)  LCL - Varus: normal (0 to 2mm)Pivot Shift: normal (Equal)Reverse Pivot Shift: normal (Equal)Dial Test at 30 degrees: normal (< 5 degrees)Dial Test at 90 degrees: normal (< 5 degrees)  Posterior Sag Test: negative  Posterolateral Corner: unstable (>15 degrees difference)  Patella   Patellar apprehension: negative  Passive Patellar Tilt: neutral  Patellar Tracking: normal  Patellar Glide (Quadrants): Lateral - 1 Medial - 2  Q-Angle at 90 degrees: normal  Patellar Grind: positive  J-Sign: J sign absent    Other   Meniscal Cyst: absent  Popliteal (Baker's) Cyst: absent  Sensation: normal    Right Hip Exam     Tests   Vielka: negative  Left Hip Exam     Tests   Vielka: negative          Reflexes     Left Side  Quadriceps:  2+  Achilles:  2+    Right Side   Quadriceps:  2+  Achilles:  2+    Vascular Exam     Right Pulses  Dorsalis Pedis:      2+  Posterior Tibial:      2+        Left Pulses  Dorsalis Pedis:      2+  Posterior Tibial:      2+            TECHNIQUE:  Five views of the knees were obtained, with AP standing, AP standing/flexion and merchant's projections of both knees and a lateral view of each knee submitted.    COMPARISON:  Comparison is made to 02/28/2018.    FINDINGS:  Marked medial compartment tibiofemoral joint space narrowing is again observed bilaterally.  No significant degree of patellofemoral joint space narrowing is seen on either side.  Some minor patellar spurring is seen on each side.  Osseous structures demonstrate no evidence of recent or healing fracture or lytic destructive process.  Incidental note is again made of some arterial vascular calcification in the soft tissues of the popliteal fossa on the right side.      Impression       Degenerative arthritic changes are again noted  bilaterally, preferentially involving the medial tibiofemoral compartment on each side.  Findings are quite similar to the earlier exam of 02/28/2018.     Bilateral KL 3        Assessment:       Encounter Diagnoses   Name Primary?    Primary osteoarthritis of both knees Yes    Pain in both knees, unspecified chronicity     Genu varum of both lower extremities           Plan:       1. IKDC, SF-12 and KOOS was filled out today in clinic.     RTC in 3 months with Dr. Cristin Peraza  For follow up. Patient will fill out IKDC, SF-12 and KOOS and bilateral knee series on return.    2. Injection Procedure-Left knee      After time out was performed, including verification of patient ID, procedure, site and side, availability of information and equipment, review of safety issues, and agreement with consent, the procedure site was marked and the patient was prepped aseptically. A diagnostic and therapeutic injection of 6cc SynviscOne and ethyl chloride spray was given under sterile technique using a 22g x 1.5 needle into the left Knee Joint in seated position.      The patient had no adverse reactions to the medication. Pain decreased. The patient was instructed to apply ice to the joint for 20 minutes and avoid strenuous activities for 24-36 hours following the injection. Patient was warned of possible blood sugar and/or blood pressure changes during that time. Following that time, patient can resume regular activities.     Patient was reminded to call the clinic immediately for any adverse side effects as explained in clinic today.     Ultrasound Guidance Procedure  left Knee Joint visualized with documented DJD. Dynamic visualization of the 22g x 1.5 needle performed.       3. Aspiration/Injection Procedure-Right Knee    After time out was performed, including verification of patient ID, procedure, site and side, availability of information and equipment, review of safety issues, and agreement with consent, the procedure  site was marked and the patient was prepped aseptically. 7cc's of normal joint fluid was aspirated from the right Knee Joint using a 22g x 1.5 needle in sterile fashion without complication. Following aspiration, a diagnostic and therapeutic injection of 6cc SynviscOne and ethyl chloride was given under sterile technique using a 22g x 1.5 needle into the right Knee Joint in seated position.     The patient had no adverse reactions to the medication. Pain decreased. The patient was instructed to apply ice to the joint for 20 minutes and avoid strenuous activities for 24-36 hours following the injection. Patient was warned of possible blood sugar and/or blood pressure changes during that time. Following that time, patient can resume regular activities.    4.  brace for impact activities    5. Sutures removed from Left Dorsal IP joint 4th and Proximal Phalanx 5th finger                 [facility] patient questionnaires have been collected today.

## 2019-01-30 ENCOUNTER — OFFICE VISIT (OUTPATIENT)
Dept: SPORTS MEDICINE | Facility: CLINIC | Age: 74
End: 2019-01-30
Payer: MEDICARE

## 2019-01-30 ENCOUNTER — HOSPITAL ENCOUNTER (OUTPATIENT)
Dept: RADIOLOGY | Facility: HOSPITAL | Age: 74
Discharge: HOME | End: 2019-01-30
Attending: PHYSICIAN ASSISTANT
Payer: MEDICARE

## 2019-01-30 VITALS
HEART RATE: 65 BPM | DIASTOLIC BLOOD PRESSURE: 76 MMHG | WEIGHT: 270 LBS | HEIGHT: 75 IN | BODY MASS INDEX: 33.57 KG/M2 | SYSTOLIC BLOOD PRESSURE: 125 MMHG

## 2019-01-30 DIAGNOSIS — M25.561 PAIN IN BOTH KNEES, UNSPECIFIED CHRONICITY: Primary | ICD-10-CM

## 2019-01-30 DIAGNOSIS — M21.162 GENU VARUM OF BOTH LOWER EXTREMITIES: ICD-10-CM

## 2019-01-30 DIAGNOSIS — M25.562 PAIN IN BOTH KNEES, UNSPECIFIED CHRONICITY: ICD-10-CM

## 2019-01-30 DIAGNOSIS — M25.562 PAIN IN BOTH KNEES, UNSPECIFIED CHRONICITY: Primary | ICD-10-CM

## 2019-01-30 DIAGNOSIS — M17.0 PRIMARY OSTEOARTHRITIS OF BOTH KNEES: ICD-10-CM

## 2019-01-30 DIAGNOSIS — M21.161 GENU VARUM OF BOTH LOWER EXTREMITIES: ICD-10-CM

## 2019-01-30 DIAGNOSIS — M25.561 PAIN IN BOTH KNEES, UNSPECIFIED CHRONICITY: ICD-10-CM

## 2019-01-30 PROCEDURE — 99999 PR PBB SHADOW E&M-EST. PATIENT-LVL III: ICD-10-PCS | Mod: PBBFAC,,, | Performed by: PHYSICIAN ASSISTANT

## 2019-01-30 PROCEDURE — 99213 OFFICE O/P EST LOW 20 MIN: CPT | Mod: PBBFAC,25,PO | Performed by: PHYSICIAN ASSISTANT

## 2019-01-30 PROCEDURE — 97110 PR THERAPEUTIC EXERCISES: ICD-10-PCS | Mod: GP,,, | Performed by: PHYSICIAN ASSISTANT

## 2019-01-30 PROCEDURE — 97110 THERAPEUTIC EXERCISES: CPT | Mod: GP,,, | Performed by: PHYSICIAN ASSISTANT

## 2019-01-30 PROCEDURE — 73564 X-RAY EXAM KNEE 4 OR MORE: CPT | Mod: 26,50,, | Performed by: RADIOLOGY

## 2019-01-30 PROCEDURE — 99214 PR OFFICE/OUTPT VISIT, EST, LEVL IV, 30-39 MIN: ICD-10-PCS | Mod: S$PBB,,, | Performed by: PHYSICIAN ASSISTANT

## 2019-01-30 PROCEDURE — 99214 OFFICE O/P EST MOD 30 MIN: CPT | Mod: S$PBB,,, | Performed by: PHYSICIAN ASSISTANT

## 2019-01-30 PROCEDURE — 73564 XR KNEE ORTHO BILAT WITH FLEXION: ICD-10-PCS | Mod: 26,50,, | Performed by: RADIOLOGY

## 2019-01-30 PROCEDURE — 73564 X-RAY EXAM KNEE 4 OR MORE: CPT | Mod: TC,50,FY,PO

## 2019-01-30 PROCEDURE — 99999 PR PBB SHADOW E&M-EST. PATIENT-LVL III: CPT | Mod: PBBFAC,,, | Performed by: PHYSICIAN ASSISTANT

## 2019-01-30 NOTE — PROGRESS NOTES
Subjective:          Chief Complaint: Carlos Alberto Modi is a 74 y.o. male who had concerns including Follow-up of the Right Knee and Follow-up of the Left Knee.    Patient is here for follow up for both knees.He received bilateral  Synvisc-One injections 3 months ago. He reports 95% relief from injections. No complaints today.            Review of Systems   Constitution: Negative for fever and night sweats.   HENT: Negative for hearing loss.    Eyes: Negative for blurred vision and visual disturbance.   Cardiovascular: Negative for chest pain and leg swelling.   Respiratory: Negative for shortness of breath.    Endocrine: Negative for polyuria.   Hematologic/Lymphatic: Negative for bleeding problem.   Skin: Negative for rash.   Musculoskeletal: Negative for back pain, joint pain, joint swelling, muscle cramps and muscle weakness.   Gastrointestinal: Negative for melena.   Genitourinary: Negative for hematuria.   Neurological: Negative for loss of balance, numbness and paresthesias.   Psychiatric/Behavioral: Negative for altered mental status.       Pain Related Questions  Over the past 3 days, what was your average pain during activity? (I.e. running, jogging, walking, climbing stairs, getting dressed, ect.): 0  Over the past 3 days, what was your highest pain level?: 0  Over the past 3 days, what was your lowest pain level? : 0    Other  How many nights a week are you awakened by your affected body part?: 0  Was the patient's HEIGHT measured or patient reported?: Patient Reported  Was the patient's WEIGHT measured or patient reported?: Measured      Objective:        General: Carlos Alberto is well-developed, well-nourished, appears stated age, in no acute distress, alert and oriented to time, place and person.     General    Vitals reviewed.  Constitutional: He is oriented to person, place, and time. He appears well-developed and well-nourished. No distress.   HENT:   Mouth/Throat: No oropharyngeal exudate.   Eyes: Right eye  exhibits no discharge. Left eye exhibits no discharge.   Neck: Normal range of motion.   Pulmonary/Chest: Effort normal and breath sounds normal. No respiratory distress.   Neurological: He is alert and oriented to person, place, and time. He has normal reflexes. No cranial nerve deficit. Coordination normal.   Psychiatric: He has a normal mood and affect. His behavior is normal. Judgment and thought content normal.     General Musculoskeletal Exam   Gait: normal       Right Knee Exam     Inspection   Erythema: absent  Scars: absent  Swelling: absent  Effusion: absent  Deformity: absent  Bruising: absent    Tenderness   The patient is experiencing no tenderness.     Crepitus   The patient has crepitus of the patella.    Range of Motion   Right knee extension thgthrthathdtheth:th th6th. Flexion: 130     Tests   Meniscus   Marissa:  Medial - negative Lateral - negative  Ligament Examination Lachman: normal (-1 to 2mm) PCL-Posterior Drawer: normal (0 to 2mm)     MCL - Valgus: normal (0 to 2mm)  LCL - Varus: normalPivot Shift: normal (Equal)Reverse Pivot Shift: normal (Equal)Dial Test at 30 degrees: normal (< 5 degrees)Dial Test at 90 degrees: normal (< 5 degrees)  Posterior Sag Test: negative  Posterolateral Corner: unstable (>15 degrees difference)  Patella   Patellar apprehension: negative  Passive Patellar Tilt: neutral  Patellar Tracking: normal  Patellar Glide (quadrants): Lateral - 1   Medial - 2  Q-Angle at 90 degrees: normal  Patellar Grind: negative  J-Sign: none    Other   Meniscal Cyst: absent  Popliteal (Baker's) Cyst: absent  Sensation: normal    Left Knee Exam     Inspection   Erythema: absent  Scars: absent  Swelling: absent  Effusion: absent  Deformity: absent  Bruising: absent    Tenderness   The patient is experiencing no tenderness.     Crepitus   The patient has crepitus of the patella.    Range of Motion   Left knee extension thgthrthathdtheth:th th6th. Flexion: 130     Tests   Meniscus   Marissa:  Medial - negative Lateral -  negative  Stability Lachman: normal (-1 to 2mm) PCL-Posterior Drawer: normal (0 to 2mm)  MCL - Valgus: normal (0 to 2mm)  LCL - Varus: normal (0 to 2mm)Pivot Shift: normal (Equal)Reverse Pivot Shift: normal (Equal)Dial Test at 30 degrees: normal (< 5 degrees)Dial Test at 90 degrees: normal (< 5 degrees)  Posterior Sag Test: negative  Posterolateral Corner: unstable (>15 degrees difference)  Patella   Patellar apprehension: negative  Passive Patellar Tilt: neutral  Patellar Tracking: normal  Patellar Glide (Quadrants): Lateral - 1 Medial - 2  Q-Angle at 90 degrees: normal  Patellar Grind: positive  J-Sign: J sign absent    Other   Meniscal Cyst: absent  Popliteal (Baker's) Cyst: absent  Sensation: normal    Right Hip Exam     Tests   Vielka: negative  Left Hip Exam     Tests   Vielka: negative          Reflexes     Left Side  Quadriceps:  2+  Achilles:  2+    Right Side   Quadriceps:  2+  Achilles:  2+    Vascular Exam     Right Pulses  Dorsalis Pedis:      2+  Posterior Tibial:      2+        Left Pulses  Dorsalis Pedis:      2+  Posterior Tibial:      2+          Radiographic Findings 01/30/2019:    XR KNEE ORTHO BILAT WITH FLEXION    CLINICAL HISTORY:  Pain in right knee    TECHNIQUE:  AP standing of both knees, PA flexion standing views of both knees, and Merchant views of both knees were performed.  Lateral views of both knees were also performed.    COMPARISON:  No 07/18/2018 ne    FINDINGS:  DJD.  The patellofemoral and the medial tibiofemoral joint spaces are narrowed bilaterally.  No fracture or dislocation.  No bone destruction identified.    Xrays of the knees were ordered and reviewed by me today. These findings were discussed and reviewed with the patient.          Assessment:       Encounter Diagnoses   Name Primary?    Pain in both knees, unspecified chronicity Yes    Primary osteoarthritis of both knees     Genu varum of both lower extremities           Plan:       1. IKDC, SF-12 and KOOS was filled  out today in clinic.     RTC in 3 months with Dr. Cristin Peraza  For bilateral synvisc-one injections. Patient will fill out IKDC, SF-12 and KOOS and bilateral knee series on return.    2. HEP [id number] - Kendall Purvis PA-C, instructed and demonstrated a patellofemoral HEP. The patient then demonstrated understanding of exercises and proper technique. This program was performed for 15 minutes.     3.  brace for impact activities.    4. Prior-authorizations for bilateral Synvisc injections     All of the patient's questions were answered and the patient will contact us if they have any questions or concerns in the interim.                [facility] patient questionnaires have been collected today.

## 2019-04-29 ENCOUNTER — HOSPITAL ENCOUNTER (OUTPATIENT)
Dept: RADIOLOGY | Facility: HOSPITAL | Age: 74
Discharge: HOME | End: 2019-04-29
Attending: ORTHOPAEDIC SURGERY
Payer: MEDICARE

## 2019-04-29 ENCOUNTER — OFFICE VISIT (OUTPATIENT)
Dept: SPORTS MEDICINE | Facility: CLINIC | Age: 74
End: 2019-04-29
Payer: MEDICARE

## 2019-04-29 VITALS
DIASTOLIC BLOOD PRESSURE: 78 MMHG | BODY MASS INDEX: 33.57 KG/M2 | HEIGHT: 75 IN | WEIGHT: 270 LBS | HEART RATE: 64 BPM | SYSTOLIC BLOOD PRESSURE: 123 MMHG

## 2019-04-29 DIAGNOSIS — M21.162 GENU VARUM OF BOTH LOWER EXTREMITIES: ICD-10-CM

## 2019-04-29 DIAGNOSIS — M25.562 PAIN IN BOTH KNEES, UNSPECIFIED CHRONICITY: ICD-10-CM

## 2019-04-29 DIAGNOSIS — M25.562 PAIN IN BOTH KNEES, UNSPECIFIED CHRONICITY: Primary | ICD-10-CM

## 2019-04-29 DIAGNOSIS — M21.161 GENU VARUM OF BOTH LOWER EXTREMITIES: ICD-10-CM

## 2019-04-29 DIAGNOSIS — M25.561 PAIN IN BOTH KNEES, UNSPECIFIED CHRONICITY: Primary | ICD-10-CM

## 2019-04-29 DIAGNOSIS — M17.0 PRIMARY OSTEOARTHRITIS OF BOTH KNEES: ICD-10-CM

## 2019-04-29 DIAGNOSIS — M25.561 PAIN IN BOTH KNEES, UNSPECIFIED CHRONICITY: ICD-10-CM

## 2019-04-29 PROCEDURE — 20610 DRAIN/INJ JOINT/BURSA W/O US: CPT | Mod: PBBFAC,PO | Performed by: ORTHOPAEDIC SURGERY

## 2019-04-29 PROCEDURE — 99499 UNLISTED E&M SERVICE: CPT | Mod: S$PBB,,, | Performed by: ORTHOPAEDIC SURGERY

## 2019-04-29 PROCEDURE — 73564 X-RAY EXAM KNEE 4 OR MORE: CPT | Mod: TC,50,FY,PO

## 2019-04-29 PROCEDURE — 99999 PR PBB SHADOW E&M-EST. PATIENT-LVL III: ICD-10-PCS | Mod: PBBFAC,,, | Performed by: ORTHOPAEDIC SURGERY

## 2019-04-29 PROCEDURE — 99499 NO LOS: ICD-10-PCS | Mod: S$PBB,,, | Performed by: ORTHOPAEDIC SURGERY

## 2019-04-29 PROCEDURE — 20606 DRAIN/INJ JOINT/BURSA W/US: CPT | Mod: 50,PBBFAC,PO | Performed by: ORTHOPAEDIC SURGERY

## 2019-04-29 PROCEDURE — 73564 XR KNEE ORTHO BILAT WITH FLEXION: ICD-10-PCS | Mod: 26,50,, | Performed by: RADIOLOGY

## 2019-04-29 PROCEDURE — 20606 DRAIN/INJ JOINT/BURSA W/US: CPT | Mod: 50,S$PBB,, | Performed by: ORTHOPAEDIC SURGERY

## 2019-04-29 PROCEDURE — 20606 PR DRAIN/ASP/INJECT INTERMED JOINT/BURSA W/US GUIDANCE: ICD-10-PCS | Mod: 50,S$PBB,, | Performed by: ORTHOPAEDIC SURGERY

## 2019-04-29 PROCEDURE — 73564 X-RAY EXAM KNEE 4 OR MORE: CPT | Mod: 26,50,, | Performed by: RADIOLOGY

## 2019-04-29 PROCEDURE — 99999 PR PBB SHADOW E&M-EST. PATIENT-LVL III: CPT | Mod: PBBFAC,,, | Performed by: ORTHOPAEDIC SURGERY

## 2019-04-29 PROCEDURE — 99213 OFFICE O/P EST LOW 20 MIN: CPT | Mod: PBBFAC,25,PO | Performed by: ORTHOPAEDIC SURGERY

## 2019-04-29 RX ADMIN — Medication 48 MG: at 12:04

## 2019-04-29 NOTE — PROGRESS NOTES
Subjective:          Chief Complaint: Carlos Alberto Modi is a 74 y.o. male who had concerns including Injections of the Right Knee and Injections of the Left Knee.    Patient is here for follow up for both knees.He received bilateral  Synvisc-One injections 3 months ago. He reports 95% relief from injections. No complaints today.          Review of Systems   Constitution: Negative for fever and night sweats.   HENT: Negative for hearing loss.    Eyes: Negative for blurred vision and visual disturbance.   Cardiovascular: Negative for chest pain and leg swelling.   Respiratory: Negative for shortness of breath.    Endocrine: Negative for polyuria.   Hematologic/Lymphatic: Negative for bleeding problem.   Skin: Negative for rash.   Musculoskeletal: Negative for back pain, joint pain, joint swelling, muscle cramps and muscle weakness.   Gastrointestinal: Negative for melena.   Genitourinary: Negative for hematuria.   Neurological: Negative for loss of balance, numbness and paresthesias.   Psychiatric/Behavioral: Negative for altered mental status.                   Objective:        General: Carlos Alberto is well-developed, well-nourished, appears stated age, in no acute distress, alert and oriented to time, place and person.     General    Vitals reviewed.  Constitutional: He is oriented to person, place, and time. He appears well-developed and well-nourished. No distress.   HENT:   Mouth/Throat: No oropharyngeal exudate.   Eyes: Right eye exhibits no discharge. Left eye exhibits no discharge.   Neck: Normal range of motion.   Pulmonary/Chest: Effort normal and breath sounds normal. No respiratory distress.   Neurological: He is alert and oriented to person, place, and time. He has normal reflexes. No cranial nerve deficit. Coordination normal.   Psychiatric: He has a normal mood and affect. His behavior is normal. Judgment and thought content normal.     General Musculoskeletal Exam   Gait: normal       Right Knee Exam      Inspection   Erythema: absent  Scars: absent  Swelling: absent  Effusion: absent  Deformity: absent  Bruising: absent    Tenderness   The patient is experiencing no tenderness.     Crepitus   The patient has crepitus of the patella.    Range of Motion   Right knee extension thgthrthathdtheth:th th6th. Flexion: 130     Tests   Meniscus   Marissa:  Medial - negative Lateral - negative  Ligament Examination Lachman: normal (-1 to 2mm) PCL-Posterior Drawer: normal (0 to 2mm)     MCL - Valgus: normal (0 to 2mm)  LCL - Varus: normalPivot Shift: normal (Equal)Reverse Pivot Shift: normal (Equal)Dial Test at 30 degrees: normal (< 5 degrees)Dial Test at 90 degrees: normal (< 5 degrees)  Posterior Sag Test: negative  Posterolateral Corner: unstable (>15 degrees difference)  Patella   Patellar apprehension: negative  Passive Patellar Tilt: neutral  Patellar Tracking: normal  Patellar Glide (quadrants): Lateral - 1   Medial - 2  Q-Angle at 90 degrees: normal  Patellar Grind: negative  J-Sign: none    Other   Meniscal Cyst: absent  Popliteal (Baker's) Cyst: absent  Sensation: normal    Left Knee Exam     Inspection   Erythema: absent  Scars: absent  Swelling: absent  Effusion: absent  Deformity: absent  Bruising: absent    Tenderness   The patient is experiencing no tenderness.     Crepitus   The patient has crepitus of the patella.    Range of Motion   Left knee extension thgthrthathdtheth:th th6th. Flexion: 130     Tests   Meniscus   Marissa:  Medial - negative Lateral - negative  Stability Lachman: normal (-1 to 2mm) PCL-Posterior Drawer: normal (0 to 2mm)  MCL - Valgus: normal (0 to 2mm)  LCL - Varus: normal (0 to 2mm)Pivot Shift: normal (Equal)Reverse Pivot Shift: normal (Equal)Dial Test at 30 degrees: normal (< 5 degrees)Dial Test at 90 degrees: normal (< 5 degrees)  Posterior Sag Test: negative  Posterolateral Corner: unstable (>15 degrees difference)  Patella   Patellar apprehension: negative  Passive Patellar Tilt: neutral  Patellar Tracking:  normal  Patellar Glide (Quadrants): Lateral - 1 Medial - 2  Q-Angle at 90 degrees: normal  Patellar Grind: positive  J-Sign: J sign absent    Other   Meniscal Cyst: absent  Popliteal (Baker's) Cyst: absent  Sensation: normal    Right Hip Exam     Tests   Vielka: negative  Left Hip Exam     Tests   Vielka: negative          Reflexes     Left Side  Quadriceps:  2+  Achilles:  2+    Right Side   Quadriceps:  2+  Achilles:  2+    Vascular Exam     Right Pulses  Dorsalis Pedis:      2+  Posterior Tibial:      2+        Left Pulses  Dorsalis Pedis:      2+  Posterior Tibial:      2+          Radiographic Findings 04/29/2019:    XR KNEE ORTHO BILAT WITH FLEXION    CLINICAL HISTORY:  Pain in right knee    TECHNIQUE:  AP standing of both knees, PA flexion standing views of both knees, and Merchant views of both knees were performed.  Lateral views of both knees were also performed.    COMPARISON:  No 07/18/2018 ne    FINDINGS:  DJD.  The patellofemoral and the medial tibiofemoral joint spaces are narrowed bilaterally.  No fracture or dislocation.  No bone destruction identified.    Xrays of the knees were ordered and reviewed by me today. These findings were discussed and reviewed with the patient.          Assessment:       Encounter Diagnoses   Name Primary?    Pain in both knees, unspecified chronicity Yes    Primary osteoarthritis of both knees     Genu varum of both lower extremities           Plan:       1. IKDC, SF-12 and KOOS was filled out today in clinic.     RTC in 3 months with Dr. Cristin Peraza  For reevaluation for bilateral synvisc-one injections. Patient will fill out IKDC, SF-12 and KOOS on return.    2. HEP 36088 - Kenadll Purvis PA-C, instructed and demonstrated a patellofemoral HEP. The patient then demonstrated understanding of exercises and proper technique. This program was performed for 15 minutes.     3.  brace for impact activities.    4. Prior-authorizations for bilateral Synvisc injections      All of the patient's questions were answered and the patient will contact us if they have any questions or concerns in the interim.                [facility] patient questionnaires have been collected today.

## 2019-04-29 NOTE — PATIENT INSTRUCTIONS
DESCRIPTION  Synvisc-One (hylan G-F 20) is an elastoviscous high molecular weight fluid containing hylan A and hylan B polymers produced from chicken galdamez. Hylans are derivatives of hyaluronan (sodium hyaluronate). Hylan G-F 20 is unique in that the hyaluronan is chemically cross linked. Hyaluronan is a long-chain polymer containing repeating disaccharide units of Uz-adgekntbiff-K-acetylglucosamine.     INDICATIONS FOR USE  Synvisc-One is indicated for the treatment of pain in osteoarthritis (OA) of the knee in patients who have failed to respond adequately to conservative nonpharmacologic therapy and simple analgesics, e.g., acetaminophen.     Who is a candidate for Synvisc-One  Patients with knee osteoarthritis, who have tried diet, exercise and over-the-counter pain medication but still have pain, should talk to their doctor to see if Synvisc-One is right for them.     How Synvisc-One is administered  Synvisc-One is a single injection. It's a simple, in-office procedure that only takes a few minutes.     What you can expect following a Synvisc-One knee injection Synvisc-One can provide up to six months of osteoarthritis knee pain relief. Everyone responds differently, but in a medical study* patients experienced relief starting one month after the injection.     After the injection, you can resume normal day-to-day activities, but you should avoid any strenuous activities for about 48 hours.     Contraindication  Do not administer to patients with known hypersensitivity (allergy) to hyaluronan (sodium hyaluronate) preparations.   Do not inject Synvisc-One in the knees of patients having knee joint infections or skin diseases or infections in the area of theinjection site.    What are possible side effects?  Synvisc may occur short-term pain, swelling at the injection site and / or the appearance of synovial exudate after injection. In some cases, exudation may be significant and cause more prolonged pain.      Important Safety Information  Before trying Synvisc-One or SYNVISC, tell your doctor if you are allergic to products from birds - such as feathers, eggs or poultry - or if your leg is swollen or infected. Synvisc-One and SYNVISC are only for injection into the knee, performed by a doctor or other qualified health care professional. Synvisc-One and SYNVISC have not been tested to show pain relief in joints other than the knee. Talk to your doctor before resuming strenuous weight-bearing activities after treatment. Synvisc-One and SYNVISC have not been tested in children, pregnant women or women who are nursing. You should tell your doctor if you think you are pregnant or if you are nursing a child. The side effects most commonly seen when Synvisc-One or SYNVISC is injected into the knee were pain, swelling and/or fluid buildup in or around the knee. Cases where the swelling is extensive or painful should be discussed with your doctor. Allergic reactions such as rash and hives have been reported rarely.

## 2019-04-29 NOTE — LETTER
April 29, 2019      Shen Purvis PA-C  [address]  [facility] [address]           [facility]  [address]  [facility] [address]  Phone: [phone]          Patient: Carlos Alberto Modi   MR Number: [id number]   YOB: 1945   Date of Visit: 4/29/2019       Dear Shen Purvis:    Thank you for referring Carlos Alberto Modi to me for evaluation. Attached you will find relevant portions of my assessment and plan of care.    If you have questions, please do not hesitate to call me. I look forward to following Carlos Alberto Modi along with you.    Sincerely,    Cristin Peraza MD    Enclosure  CC:  No Recipients    If you would like to receive this communication electronically, please contact [email or web address] or [phone] to request more information on [employer] access.    For providers and/or their staff who would like to refer a patient to [facility], please contact us through our one-stop-shop provider referral line, [facility], at [phone].    If you feel you have received this communication in error or would no longer like to receive these types of communications, please e-mail [email or web address]

## 2019-04-30 ENCOUNTER — PATIENT MESSAGE (OUTPATIENT)
Dept: SPORTS MEDICINE | Facility: CLINIC | Age: 74
End: 2019-04-30

## 2019-07-25 ENCOUNTER — PATIENT MESSAGE (OUTPATIENT)
Dept: SPORTS MEDICINE | Facility: CLINIC | Age: 74
End: 2019-07-25

## 2019-07-31 ENCOUNTER — PATIENT MESSAGE (OUTPATIENT)
Dept: SPORTS MEDICINE | Facility: CLINIC | Age: 74
End: 2019-07-31

## 2019-08-10 ENCOUNTER — PATIENT MESSAGE (OUTPATIENT)
Dept: SPORTS MEDICINE | Facility: CLINIC | Age: 74
End: 2019-08-10

## 2019-08-11 ENCOUNTER — PATIENT MESSAGE (OUTPATIENT)
Dept: SPORTS MEDICINE | Facility: CLINIC | Age: 74
End: 2019-08-11

## 2019-08-12 ENCOUNTER — TELEPHONE (OUTPATIENT)
Dept: SPORTS MEDICINE | Facility: CLINIC | Age: 74
End: 2019-08-12

## 2019-08-12 DIAGNOSIS — M17.0 PRIMARY OSTEOARTHRITIS OF BOTH KNEES: Primary | ICD-10-CM

## 2019-08-13 ENCOUNTER — OFFICE VISIT (OUTPATIENT)
Dept: ORTHOPEDICS | Facility: CLINIC | Age: 74
End: 2019-08-13
Payer: MEDICARE

## 2019-08-13 VITALS
BODY MASS INDEX: 33.57 KG/M2 | SYSTOLIC BLOOD PRESSURE: 123 MMHG | DIASTOLIC BLOOD PRESSURE: 82 MMHG | RESPIRATION RATE: 18 BRPM | HEART RATE: 64 BPM | WEIGHT: 270 LBS | HEIGHT: 75 IN

## 2019-08-13 DIAGNOSIS — M17.0 PRIMARY OSTEOARTHRITIS OF BOTH KNEES: Primary | ICD-10-CM

## 2019-08-13 PROCEDURE — 99999 PR PBB SHADOW E&M-EST. PATIENT-LVL III: CPT | Mod: PBBFAC,,, | Performed by: ORTHOPAEDIC SURGERY

## 2019-08-13 PROCEDURE — 99213 OFFICE O/P EST LOW 20 MIN: CPT | Mod: PBBFAC | Performed by: ORTHOPAEDIC SURGERY

## 2019-08-13 PROCEDURE — 20610 PR DRAIN/INJECT LARGE JOINT/BURSA: ICD-10-PCS | Mod: 50,S$PBB,, | Performed by: ORTHOPAEDIC SURGERY

## 2019-08-13 PROCEDURE — 99499 NO LOS: ICD-10-PCS | Mod: S$PBB,,, | Performed by: ORTHOPAEDIC SURGERY

## 2019-08-13 PROCEDURE — 20610 DRAIN/INJ JOINT/BURSA W/O US: CPT | Mod: 50,PBBFAC | Performed by: ORTHOPAEDIC SURGERY

## 2019-08-13 PROCEDURE — 99999 PR PBB SHADOW E&M-EST. PATIENT-LVL III: ICD-10-PCS | Mod: PBBFAC,,, | Performed by: ORTHOPAEDIC SURGERY

## 2019-08-13 PROCEDURE — 99499 UNLISTED E&M SERVICE: CPT | Mod: S$PBB,,, | Performed by: ORTHOPAEDIC SURGERY

## 2019-08-13 PROCEDURE — 20610 DRAIN/INJ JOINT/BURSA W/O US: CPT | Mod: 50,S$PBB,, | Performed by: ORTHOPAEDIC SURGERY

## 2019-08-13 RX ADMIN — Medication 48 MG: at 09:08

## 2019-08-13 NOTE — LETTER
August 13, 2019      Cristin Peraza MD  [address]  [place] [address]           [facility]  [address] [facility] [place] [address]  Phone: [phone]  Fax: [phone]          Patient: Carlos Alberto Modi   MR Number: [id number]   YOB: 1945   Date of Visit: 8/13/2019       Dear Dr. Cristin Peraza:    Thank you for referring Carlos Alberto Modi to me for evaluation. Attached you will find relevant portions of my assessment and plan of care.    If you have questions, please do not hesitate to call me. I look forward to following Carlos Alberto Modi along with you.    Sincerely,    Sulaiman Rodriguez MD    Enclosure  CC:  No Recipients    If you would like to receive this communication electronically, please contact [email or web address] or [phone] to request more information on [employer] Link access.    For providers and/or their staff who would like to refer a patient to [facility], please contact us through our one-stop-shop provider referral line, [facility] , at [phone].    If you feel you have received this communication in error or would no longer like to receive these types of communications, please e-mail [email or web address]

## 2019-08-13 NOTE — PROGRESS NOTES
Patient is here for follow up of his knee arthritis. He is requesting synvisc one injection. He understands potential risks and benefits of the procedure which includes pseudoseptic reaction and pain. He has failed conservative management to this point for his knee arthritis including NSAIDS.    After verbal consent and time out and viscosupplementation info and post-injection info was given, the bilateral knee was prepped with alcohol and chloroprep and injected from an anterolateral approach with 48 mg synvisc. Patient tolerated the injection well.

## 2019-12-06 ENCOUNTER — TELEPHONE (OUTPATIENT)
Dept: ORTHOPEDICS | Facility: CLINIC | Age: 74
End: 2019-12-06

## 2019-12-06 DIAGNOSIS — M25.561 PAIN IN BOTH KNEES, UNSPECIFIED CHRONICITY: Primary | ICD-10-CM

## 2019-12-06 DIAGNOSIS — M25.562 PAIN IN BOTH KNEES, UNSPECIFIED CHRONICITY: Primary | ICD-10-CM

## 2019-12-06 NOTE — TELEPHONE ENCOUNTER
Attempted to contact pt. Left voicemail. Informed pt that x-rays need to be taken prior to appt. Advised pt to arrive for x-ray appt 30 minutes prior to scheduled appt c Dr. Rodriguez. Asked pt to return call to clinic at [phone] with additional questions.

## 2020-01-03 ENCOUNTER — TELEPHONE (OUTPATIENT)
Dept: ORTHOPEDICS | Facility: CLINIC | Age: 75
End: 2020-01-03

## 2020-01-03 NOTE — TELEPHONE ENCOUNTER
Called patient to reschedule appointment on 02/03/2020  Confirmed new appointment date and time with patient. The patient verbalized understanding.

## 2020-01-18 ENCOUNTER — PATIENT MESSAGE (OUTPATIENT)
Dept: ORTHOPEDICS | Facility: CLINIC | Age: 75
End: 2020-01-18

## 2020-02-14 ENCOUNTER — OFFICE VISIT (OUTPATIENT)
Dept: ORTHOPEDICS | Facility: CLINIC | Age: 75
End: 2020-02-14
Payer: MEDICARE

## 2020-02-14 ENCOUNTER — HOSPITAL ENCOUNTER (OUTPATIENT)
Dept: RADIOLOGY | Facility: HOSPITAL | Age: 75
Discharge: HOME | End: 2020-02-14
Attending: ORTHOPAEDIC SURGERY
Payer: MEDICARE

## 2020-02-14 VITALS
SYSTOLIC BLOOD PRESSURE: 115 MMHG | BODY MASS INDEX: 33.57 KG/M2 | DIASTOLIC BLOOD PRESSURE: 81 MMHG | WEIGHT: 270 LBS | HEART RATE: 72 BPM | HEIGHT: 75 IN

## 2020-02-14 DIAGNOSIS — M25.511 RIGHT SHOULDER PAIN, UNSPECIFIED CHRONICITY: Primary | ICD-10-CM

## 2020-02-14 DIAGNOSIS — M21.162 GENU VARUM OF BOTH LOWER EXTREMITIES: ICD-10-CM

## 2020-02-14 DIAGNOSIS — M25.562 PAIN IN BOTH KNEES, UNSPECIFIED CHRONICITY: ICD-10-CM

## 2020-02-14 DIAGNOSIS — M25.561 PAIN IN BOTH KNEES, UNSPECIFIED CHRONICITY: ICD-10-CM

## 2020-02-14 DIAGNOSIS — M21.161 GENU VARUM OF BOTH LOWER EXTREMITIES: ICD-10-CM

## 2020-02-14 DIAGNOSIS — M17.0 PRIMARY OSTEOARTHRITIS OF BOTH KNEES: Primary | ICD-10-CM

## 2020-02-14 PROCEDURE — 99214 OFFICE O/P EST MOD 30 MIN: CPT | Mod: 25,S$PBB,, | Performed by: ORTHOPAEDIC SURGERY

## 2020-02-14 PROCEDURE — 20610 DRAIN/INJ JOINT/BURSA W/O US: CPT | Mod: 50,PBBFAC | Performed by: ORTHOPAEDIC SURGERY

## 2020-02-14 PROCEDURE — 20610 PR DRAIN/INJECT LARGE JOINT/BURSA: ICD-10-PCS | Mod: 50,S$PBB,, | Performed by: ORTHOPAEDIC SURGERY

## 2020-02-14 PROCEDURE — 99999 PR PBB SHADOW E&M-EST. PATIENT-LVL III: CPT | Mod: PBBFAC,,, | Performed by: ORTHOPAEDIC SURGERY

## 2020-02-14 PROCEDURE — 73564 X-RAY EXAM KNEE 4 OR MORE: CPT | Mod: 26,50,, | Performed by: RADIOLOGY

## 2020-02-14 PROCEDURE — 73564 X-RAY EXAM KNEE 4 OR MORE: CPT | Mod: TC,50

## 2020-02-14 PROCEDURE — 99999 PR PBB SHADOW E&M-EST. PATIENT-LVL III: ICD-10-PCS | Mod: PBBFAC,,, | Performed by: ORTHOPAEDIC SURGERY

## 2020-02-14 PROCEDURE — 73564 XR KNEE ORTHO BILAT WITH FLEXION: ICD-10-PCS | Mod: 26,50,, | Performed by: RADIOLOGY

## 2020-02-14 PROCEDURE — 20610 DRAIN/INJ JOINT/BURSA W/O US: CPT | Mod: 50,S$PBB,, | Performed by: ORTHOPAEDIC SURGERY

## 2020-02-14 PROCEDURE — 99214 PR OFFICE/OUTPT VISIT, EST, LEVL IV, 30-39 MIN: ICD-10-PCS | Mod: 25,S$PBB,, | Performed by: ORTHOPAEDIC SURGERY

## 2020-02-14 PROCEDURE — 99213 OFFICE O/P EST LOW 20 MIN: CPT | Mod: PBBFAC,25 | Performed by: ORTHOPAEDIC SURGERY

## 2020-02-14 RX ORDER — DOXYCYCLINE 100 MG/1
100 CAPSULE ORAL 2 TIMES DAILY
COMMUNITY
End: 2023-05-08

## 2020-02-14 RX ORDER — ALBUTEROL SULFATE 0.83 MG/ML
2.5 SOLUTION RESPIRATORY (INHALATION) EVERY 6 HOURS PRN
COMMUNITY

## 2020-02-14 RX ORDER — ETODOLAC 400 MG/1
TABLET, FILM COATED ORAL
COMMUNITY
End: 2022-10-03

## 2020-02-14 RX ORDER — BUDESONIDE AND FORMOTEROL FUMARATE DIHYDRATE 160; 4.5 UG/1; UG/1
2 AEROSOL RESPIRATORY (INHALATION) EVERY 12 HOURS
COMMUNITY

## 2020-02-14 RX ORDER — ACETAMINOPHEN AND CODEINE PHOSPHATE 120; 12 MG/5ML; MG/5ML
SOLUTION ORAL
COMMUNITY

## 2020-02-14 RX ORDER — PREDNISONE 20 MG/1
20 TABLET ORAL DAILY
COMMUNITY
End: 2023-10-03

## 2020-02-14 RX ADMIN — Medication 48 MG: at 11:02

## 2020-02-14 NOTE — PROGRESS NOTES
Subjective:     Patient ID: Carlos Alberto Modi is a 75 y.o. male.    Chief Complaint: Follow-up of the Right Knee and Follow-up of the Left Knee    02/14/2020    Carlos Alberto Modi, a 75 y.o. MALE is coming in today for a follow up of his RIGHT and LEFT knee. Patient states his injections are working well. He received synvisc injections on 08/13/19 8/13/19    Knee Pain    The pain is present in the left knee and right knee. This is a chronic problem. The current episode started more than 1 month ago. The problem occurs rarely. The problem has been rapidly improving. The pain is at a severity of 0/10. Pertinent negatives include no fever or numbness. He has tried injection treatment for the symptoms. The treatment provided significant relief. Physical therapy was not tried.      History reviewed. No pertinent past medical history.  Past Surgical History:   Procedure Laterality Date    CATARACT EXTRACTION      SPINE SURGERY      TRANSURETHRAL RESECTION OF PROSTATE       History reviewed. No pertinent family history.  Social History     Socioeconomic History    Marital status:      Spouse name: Not on file    Number of children: Not on file    Years of education: Not on file    Highest education level: Not on file   Occupational History    Not on file   Social Needs    Financial resource strain: Not on file    Food insecurity:     Worry: Not on file     Inability: Not on file    Transportation needs:     Medical: Not on file     Non-medical: Not on file   Tobacco Use    Smoking status: Never Smoker    Smokeless tobacco: Never Used   Substance and Sexual Activity    Alcohol use: Not on file    Drug use: Not on file    Sexual activity: Not on file   Lifestyle    Physical activity:     Days per week: Not on file     Minutes per session: Not on file    Stress: Not on file   Relationships    Social connections:     Talks on phone: Not on file     Gets together: Not on file     Attends Sikhism service:  Not on file     Active member of club or organization: Not on file     Attends meetings of clubs or organizations: Not on file     Relationship status: Not on file   Other Topics Concern    Not on file   Social History Narrative    Not on file     Medication List with Changes/Refills   Current Medications    ACETAMINOPHEN WITH CODEINE (ACETAMINOPHEN-CODEINE) 120MG 12MG 5ML SOLN    Take by mouth. 1 or 2 60mg acetaminophen/codeine as needed for headache    ALBUTEROL (PROVENTIL) 2.5 MG /3 ML (0.083 %) NEBULIZER SOLUTION    Take 2.5 mg by nebulization every 6 (six) hours as needed for Wheezing. Rescue    ATORVASTATIN (LIPITOR) 10 MG TABLET    Take 10 mg by mouth once daily.    AZELASTINE (ASTELIN) 137 MCG (0.1 %) NASAL SPRAY    1 spray by Nasal route 2 (two) times daily.    BECLOMETHASONE (QVAR) 80 MCG/ACTUATION AERO    Inhale 1 puff into the lungs 2 (two) times daily. Controller    BUDESONIDE-FORMOTEROL 160-4.5 MCG (SYMBICORT) 160-4.5 MCG/ACTUATION HFAA    Inhale 2 puffs into the lungs every 12 (twelve) hours. Controller    CALCIUM CITRATE-VITAMIN D3 315-200 MG (CALCIUM CITRATE + D) 315-200 MG-UNIT PER TABLET    Take 1 tablet by mouth 2 (two) times daily.    CHOLECALCIFERAL (CHOLECALCIFERAL) 100,000 IU/ML INJECTTION    Inject 300,000 Int'l Units into the muscle every 3 (three) months.    CYANOCOBALAMIN, VITAMIN B-12, (VITAMIN B-12) 5,000 MCG SUBL    Place under the tongue.    DOXYCYCLINE (MONODOX) 100 MG CAPSULE    Take 100 mg by mouth 2 (two) times daily.    ETODOLAC (LODINE) 400 MG TABLET    etodolac 400 mg tablet    FLUOXETINE (PROZAC) 20 MG CAPSULE    Take 20 mg by mouth once daily.    GLUCOSAMINE-CHONDROITIN 500-400 MG TABLET    Take 1 tablet by mouth 3 (three) times daily.    HYDROCHLOROTHIAZIDE (MICROZIDE) 12.5 MG CAPSULE    Take 12.5 mg by mouth once daily.    HYDROCODONE/CHLORPHEN P-STIREX (HYDROCODONE-CHLORPHENIRAMINE ORAL)    Take 5 mL/hr by mouth every 12 (twelve) hours.    HYOSCYAMINE SULFATE/PHENOBARB  (PHENOBARBITAL-HYOSCYAMINE ORAL)    Take 1.125 mg by mouth. 1.125 mg Tad Hyoscyamine and 1 15 mg Tab Phenobarbital as needed for stomach pain every 6hrs.    OMEPRAZOLE (PRILOSEC) 20 MG CAPSULE    Take 20 mg by mouth once daily.    PREDNISONE (DELTASONE) 20 MG TABLET    Take 20 mg by mouth once daily.   Discontinued Medications    ETODOLAC (LODINE) 400 MG TABLET    Take 400 mg by mouth 2 (two) times daily.     Review of patient's allergies indicates:   Allergen Reactions    Levofloxacin     Acremonium strictum allergenic extract     Aureobasidium pullulans allergenic extract     Cat dander     House dust     Mold     Ragweed pollen     Rhizopus oryzae allergenic extract     Tree and shrub pollen      Review of Systems   Constitution: Negative for chills and fever.   HENT: Negative for ear discharge and hearing loss.    Eyes: Negative for blurred vision and visual disturbance.   Cardiovascular: Negative for chest pain and leg swelling.   Respiratory: Negative for cough and shortness of breath.    Endocrine: Negative for polyuria.   Hematologic/Lymphatic: Negative for bleeding problem.   Skin: Negative for rash.   Musculoskeletal: Negative for back pain, joint pain, joint swelling, muscle cramps and muscle weakness.   Gastrointestinal: Negative for nausea and vomiting.   Genitourinary: Negative for hematuria.   Neurological: Negative for loss of balance, numbness and paresthesias.   Psychiatric/Behavioral: Negative for altered mental status.       Objective:   Body mass index is 33.75 kg/m².  Vitals:    02/14/20 1040   BP: 115/81   Pulse: 72                General    Vitals reviewed.  Constitutional: He is oriented to person, place, and time. He appears well-developed and well-nourished. No distress.   HENT:   Mouth/Throat: No oropharyngeal exudate.   Eyes: Right eye exhibits no discharge. Left eye exhibits no discharge.   Neck: Normal range of motion.   Pulmonary/Chest: Effort normal. No respiratory distress.    Neurological: He is alert and oriented to person, place, and time.   Psychiatric: He has a normal mood and affect. His behavior is normal. Judgment and thought content normal.           Right Knee Exam     Inspection   Erythema: absent  Scars: absent  Swelling: absent  Effusion: absent  Deformity: absent  Bruising: absent    Tenderness   The patient is tender to palpation of the condyle and medial joint line.    Range of Motion   Extension: 0   Flexion: 130     Tests   Meniscus   Marissa:  Medial - negative Lateral - negative  Ligament Examination Lachman: normal (-1 to 2mm) PCL-Posterior Drawer: normal (0 to 2mm)     MCL - Valgus: normal (0 to 2mm)  LCL - Varus: normal  Patella   Patellar apprehension: negative  Passive Patellar Tilt: neutral  Patellar Tracking: normal  Patellar Glide (quadrants): Lateral - 1   Medial - 2  Q-Angle at 90 degrees: normal  Patellar Grind: negative    Other   Sensation: normal    Left Knee Exam     Inspection   Erythema: absent  Scars: absent  Swelling: absent  Effusion: absent  Deformity: absent  Bruising: absent    Tenderness   The patient tender to palpation of the condyle and medial joint line.    Range of Motion   Extension: 0   Flexion: 130     Tests   Meniscus   Marissa:  Medial - negative Lateral - negative  Stability Lachman: normal (-1 to 2mm) PCL-Posterior Drawer: normal (0 to 2mm)  MCL - Valgus: normal (0 to 2mm)  LCL - Varus: normal (0 to 2mm)  Patella   Patellar apprehension: negative  Passive Patellar Tilt: neutral  Patellar Tracking: normal  Patellar Glide (Quadrants): Lateral - 1 Medial - 2  Q-Angle at 90 degrees: normal  Patellar Grind: negative    Other   Sensation: normal    Right Hip Exam     Tests   Vielka: negative  Left Hip Exam     Tests   Vielka: negative          Muscle Strength   Right Lower Extremity   Hip Abduction: 5/5   Quadriceps:  4/5   Hamstrin/5   Left Lower Extremity   Hip Abduction: 5/5   Quadriceps:  4/5   Hamstrin/5     Reflexes      Left Side  Quadriceps:  2+  Achilles:  2+    Right Side   Quadriceps:  2+  Achilles:  2+    Vascular Exam     Right Pulses  Dorsalis Pedis:      2+  Posterior Tibial:      2+        Left Pulses  Dorsalis Pedis:      2+  Posterior Tibial:      2+            Relevant imaging results reviewed and interpreted by me, discussed with the patient and / or family today   X-ray Knee Ortho Bilateral with Flexion  Narrative: EXAMINATION:  XR KNEE ORTHO BILAT WITH FLEXION    CLINICAL HISTORY:  Pain in right knee    TECHNIQUE:  AP standing of both knees, PA flexion standing views of both knees, and Merchant views of both knees were performed.  Lateral views of both knees were also performed.    COMPARISON:  04/29/2019    FINDINGS:  Osteopenia.  Bilateral tricompartmental degenerative changes with high-grade medial compartment joint space narrowing with overall findings similar to minimally progressed since the comparison exam.  No fracture.  No dislocation.  No effusion.  Atherosclerosis is present.  Soft tissues appear unremarkable.  Impression: As above    Electronically signed by: Sandro Ramirez MD  Date:    02/14/2020  Time:    10:27     Assessment:     Encounter Diagnoses   Name Primary?    Primary osteoarthritis of both knees Yes    Genu varum of both lower extremities         Plan:     We reviewed with Carlos Alberto today, the pathology and natural history of his diagnosis. We had an extensive discussion as to the conservative treatment and management of their condition. We also discussed the variety of treatment options to include medication, physical therapy, diagnostic testing as well as other treatments.The decision was made to go forward with:    -PT for quad strengthening/Home exercise program    - Medial  Brace -continue     -Synvisc one today bilateral    -Weight loss    Procedure:  Patient is here for follow up of his knee arthritis. He is requesting synvisc one injection. He understands potential risks and  benefits of the procedure which includes pseudoseptic reaction and pain. He has failed conservative management to this point for his knee arthritis including NSAIDS.  xrays of the knee are reviewed - they show osteoarthritis  After viscosupplementation info and post-injection info was given, the bilateral knees were injected with lidocaine from an anterolateral approach and then 48 mg synvisc. Patient tolerated the injections well.          Disclaimer: This note was prepared using a voice recognition system and is likely to have sound alike errors within the text.

## 2020-02-14 NOTE — PATIENT INSTRUCTIONS
If you are experiencing pain/discomfort or have questions after 5pm and would like to be connected to the [facility] on call team, please call this number [phone] and specify which Orthopedics/Sports Medicine provider is treating you.

## 2020-05-18 ENCOUNTER — TELEPHONE (OUTPATIENT)
Dept: ORTHOPEDICS | Facility: CLINIC | Age: 75
End: 2020-05-18

## 2020-05-20 ENCOUNTER — PATIENT MESSAGE (OUTPATIENT)
Dept: ORTHOPEDICS | Facility: CLINIC | Age: 75
End: 2020-05-20

## 2020-05-20 ENCOUNTER — PATIENT MESSAGE (OUTPATIENT)
Dept: SPORTS MEDICINE | Facility: CLINIC | Age: 75
End: 2020-05-20

## 2020-08-14 ENCOUNTER — OFFICE VISIT (OUTPATIENT)
Dept: ORTHOPEDICS | Facility: CLINIC | Age: 75
End: 2020-08-14
Payer: MEDICARE

## 2020-08-14 ENCOUNTER — HOSPITAL ENCOUNTER (OUTPATIENT)
Dept: RADIOLOGY | Facility: HOSPITAL | Age: 75
Discharge: HOME | End: 2020-08-14
Attending: FAMILY MEDICINE
Payer: MEDICARE

## 2020-08-14 ENCOUNTER — TELEPHONE (OUTPATIENT)
Dept: ORTHOPEDICS | Facility: CLINIC | Age: 75
End: 2020-08-14

## 2020-08-14 VITALS
DIASTOLIC BLOOD PRESSURE: 80 MMHG | SYSTOLIC BLOOD PRESSURE: 140 MMHG | HEART RATE: 62 BPM | HEIGHT: 75 IN | WEIGHT: 270.06 LBS | BODY MASS INDEX: 33.58 KG/M2

## 2020-08-14 DIAGNOSIS — M17.0 PRIMARY OSTEOARTHRITIS OF BOTH KNEES: Primary | ICD-10-CM

## 2020-08-14 DIAGNOSIS — M25.511 RIGHT SHOULDER PAIN, UNSPECIFIED CHRONICITY: Primary | ICD-10-CM

## 2020-08-14 DIAGNOSIS — M25.511 RIGHT SHOULDER PAIN, UNSPECIFIED CHRONICITY: ICD-10-CM

## 2020-08-14 DIAGNOSIS — S46.211A TEAR OF RIGHT BICEPS MUSCLE, INITIAL ENCOUNTER: ICD-10-CM

## 2020-08-14 PROCEDURE — 99999 PR PBB SHADOW E&M-EST. PATIENT-LVL IV: CPT | Mod: PBBFAC,,, | Performed by: FAMILY MEDICINE

## 2020-08-14 PROCEDURE — 20610 DRAIN/INJ JOINT/BURSA W/O US: CPT | Mod: 50,PBBFAC | Performed by: FAMILY MEDICINE

## 2020-08-14 PROCEDURE — 99214 OFFICE O/P EST MOD 30 MIN: CPT | Mod: 25,S$PBB,, | Performed by: FAMILY MEDICINE

## 2020-08-14 PROCEDURE — 73030 XR SHOULDER COMPLETE 2 OR MORE VIEWS RIGHT: ICD-10-PCS | Mod: 26,RT,, | Performed by: RADIOLOGY

## 2020-08-14 PROCEDURE — 73030 X-RAY EXAM OF SHOULDER: CPT | Mod: 26,RT,, | Performed by: RADIOLOGY

## 2020-08-14 PROCEDURE — 99214 OFFICE O/P EST MOD 30 MIN: CPT | Mod: PBBFAC,25 | Performed by: FAMILY MEDICINE

## 2020-08-14 PROCEDURE — 20610 LARGE JOINT ASPIRATION/INJECTION: BILATERAL KNEE: ICD-10-PCS | Mod: 50,S$PBB,, | Performed by: FAMILY MEDICINE

## 2020-08-14 PROCEDURE — 99999 PR PBB SHADOW E&M-EST. PATIENT-LVL IV: ICD-10-PCS | Mod: PBBFAC,,, | Performed by: FAMILY MEDICINE

## 2020-08-14 PROCEDURE — 99214 PR OFFICE/OUTPT VISIT, EST, LEVL IV, 30-39 MIN: ICD-10-PCS | Mod: 25,S$PBB,, | Performed by: FAMILY MEDICINE

## 2020-08-14 PROCEDURE — 73030 X-RAY EXAM OF SHOULDER: CPT | Mod: TC,RT

## 2020-08-14 RX ADMIN — Medication 48 MG: at 11:08

## 2020-08-14 NOTE — PROGRESS NOTES
Subjective:     Patient ID: Carlos Alberto Modi is a 75 y.o. male.    Chief Complaint: Pain of the Right Knee and Pain of the Left Knee      HPI:  This patient has been getting Synvisc 1 injection to both knees for the last few years and having very good results.  He has few knee symptoms at this point but is ready for his injection and it has been approved through insurance.  No recent swelling giving way or locking.  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    He states that a few months ago he injured his right biceps in trying to move a trailer.  A physician told him that he tore his biceps and he has noticed it deformity of the biceps in the lower right arm with mild decrease in strength but definite pain throughout the day and at night when trying to sleep.  He would like evaluation for this and is willing to have surgery if it would be helpful.  We have recommended physical therapy 1st and then referral to shoulder surgeon if this continues.    History reviewed. No pertinent past medical history.  Past Surgical History:   Procedure Laterality Date    CATARACT EXTRACTION      SPINE SURGERY      TRANSURETHRAL RESECTION OF PROSTATE       History reviewed. No pertinent family history.  Social History     Socioeconomic History    Marital status:      Spouse name: Not on file    Number of children: Not on file    Years of education: Not on file    Highest education level: Not on file   Occupational History    Not on file   Social Needs    Financial resource strain: Not on file    Food insecurity     Worry: Not on file     Inability: Not on file    Transportation needs     Medical: Not on file     Non-medical: Not on file   Tobacco Use    Smoking status: Never Smoker    Smokeless tobacco: Never Used   Substance and Sexual Activity    Alcohol use: Not on file    Drug use: Not on file    Sexual activity: Not on file   Lifestyle     Physical activity     Days per week: Not on file     Minutes per session: Not on file    Stress: Not on file   Relationships    Social connections     Talks on phone: Not on file     Gets together: Not on file     Attends Confucianism service: Not on file     Active member of club or organization: Not on file     Attends meetings of clubs or organizations: Not on file     Relationship status: Not on file   Other Topics Concern    Not on file   Social History Narrative    Not on file       Current Outpatient Medications:     acetaminophen with codeine (ACETAMINOPHEN-CODEINE) 120mg 12mg 5mL Soln, Take by mouth. 1 or 2 60mg acetaminophen/codeine as needed for headache, Disp: , Rfl:     albuterol (PROVENTIL) 2.5 mg /3 mL (0.083 %) nebulizer solution, Take 2.5 mg by nebulization every 6 (six) hours as needed for Wheezing. Rescue, Disp: , Rfl:     atorvastatin (LIPITOR) 10 MG tablet, Take 10 mg by mouth once daily., Disp: , Rfl:     azelastine (ASTELIN) 137 mcg (0.1 %) nasal spray, 1 spray by Nasal route 2 (two) times daily., Disp: , Rfl:     beclomethasone (QVAR) 80 mcg/actuation Aero, Inhale 1 puff into the lungs 2 (two) times daily. Controller, Disp: , Rfl:     budesonide-formoterol 160-4.5 mcg (SYMBICORT) 160-4.5 mcg/actuation HFAA, Inhale 2 puffs into the lungs every 12 (twelve) hours. Controller, Disp: , Rfl:     calcium citrate-vitamin D3 315-200 mg (CALCIUM CITRATE + D) 315-200 mg-unit per tablet, Take 1 tablet by mouth 2 (two) times daily., Disp: , Rfl:     cholecalciferal (CHOLECALCIFERAL) 100,000 IU/mL injecttion, Inject 300,000 Int'l Units into the muscle every 3 (three) months., Disp: , Rfl:     cyanocobalamin, vitamin B-12, (VITAMIN B-12) 5,000 mcg Subl, Place under the tongue., Disp: , Rfl:     doxycycline (MONODOX) 100 MG capsule, Take 100 mg by mouth 2 (two) times daily., Disp: , Rfl:     etodolac (LODINE) 400 MG tablet, etodolac 400 mg tablet, Disp: , Rfl:     FLUoxetine (PROZAC) 20 MG  capsule, Take 20 mg by mouth once daily., Disp: , Rfl:     glucosamine-chondroitin 500-400 mg tablet, Take 1 tablet by mouth 3 (three) times daily., Disp: , Rfl:     hydroCHLOROthiazide (MICROZIDE) 12.5 mg capsule, Take 12.5 mg by mouth once daily., Disp: , Rfl:     hydrocodone/chlorphen p-stirex (HYDROCODONE-CHLORPHENIRAMINE ORAL), Take 5 mL/hr by mouth every 12 (twelve) hours., Disp: , Rfl:     hyoscyamine sulfate/phenobarb (PHENOBARBITAL-HYOSCYAMINE ORAL), Take 1.125 mg by mouth. 1.125 mg Tad Hyoscyamine and 1 15 mg Tab Phenobarbital as needed for stomach pain every 6hrs., Disp: , Rfl:     omeprazole (PRILOSEC) 20 MG capsule, Take 20 mg by mouth once daily., Disp: , Rfl:     predniSONE (DELTASONE) 20 MG tablet, Take 20 mg by mouth once daily., Disp: , Rfl:   Review of patient's allergies indicates:   Allergen Reactions    Levofloxacin     Acremonium strictum allergenic extract     Aureobasidium pullulans allergenic extract     Cat dander     House dust     Mold     Ragweed pollen     Rhizopus oryzae allergenic extract     Tree and shrub pollen      Review of Systems   Constitutional: Negative for chills, fever and weight loss.   Respiratory: Negative for shortness of breath.    Cardiovascular: Negative for chest pain and palpitations.       Objective:   Body mass index is 33.76 kg/m².  Vitals:    08/14/20 1138   BP: (!) 140/80   Pulse: 62           Ortho/SPM Exam-alert and oriented well-nourished well-developed pleasant adult male ambulatory in no acute distress    Respiratory effort is normal    Knee joints-no acute deformity but some chronic bony changes noted    Range of motion 0-120 degrees    Strength 5/5    Neurovascular intact    Nontender to palpation    Right biceps strength is 4/5 and there is deformity noted in the lower half of the upper arm    Psychiatric good affect and cognition    IMAGING: X-ray Knee Ortho Bilateral with Flexion  Narrative: EXAMINATION:  XR KNEE ORTHO BILAT WITH  FLEXION    CLINICAL HISTORY:  Pain in right knee    TECHNIQUE:  AP standing of both knees, PA flexion standing views of both knees, and Merchant views of both knees were performed.  Lateral views of both knees were also performed.    COMPARISON:  04/29/2019    FINDINGS:  Osteopenia.  Bilateral tricompartmental degenerative changes with high-grade medial compartment joint space narrowing with overall findings similar to minimally progressed since the comparison exam.  No fracture.  No dislocation.  No effusion.  Atherosclerosis is present.  Soft tissues appear unremarkable.  Impression: As above    Electronically signed by: Sandro Ramirez MD  Date:    02/14/2020  Time:    10:27       Radiographs / Imaging : Relevant imaging results reviewed by me and interpreted by me, discussed with the patient and / or family -knee radiographs were reviewed degenerative changes noted.    I discussed with patient physical therapy for his right biceps injury and we will obtain x-rays and if possible MRI to further delineate the extent of the biceps tear.    Note for medical necessity of viscosupplementation for osteoarthritis of both knees-the patient has tried NSAID anti-inflammatory and pain medications for greater than 3 months without adequate improvement of pain also has been through physical therapy in used home exercise programs without adequate response.  His Kellgren Thomas score is greater than 2 regarding radiographic changes.  He has had cortisone injections before the improvement was or 2.    He has had previous Visco injections before which may dramatic improvement in decreasing his pain and allowing him better quality of life more mobility.[id number]      Assessment:     Encounter Diagnoses   Name Primary?    Primary osteoarthritis of both knees Yes    Right shoulder pain, unspecified chronicity     Tear of right biceps muscle, initial encounter         Plan:   Primary osteoarthritis of both knees  -     Prior  authorization Order  -     Large Joint Aspiration/Injection: bilateral knee    Right shoulder pain, unspecified chronicity  -     X-ray Shoulder 2 or More Views Right; Future; Expected date: 08/14/2020    Tear of right biceps muscle, initial encounter  -     MRI Shoulder Without Contrast Right; Future; Expected date: 08/14/2020        The patient verbalized good understanding of the medical issues discussed today and expressed appreciation for the care provided.  Patient was given the opportunity to ask questions and be an active participant in their medical care. Patient had no further questions or concerns at this time.     The patient was encouraged to participate in appropriate physical activity.      Darrick Thapa M.D.  [facility]        This note was dictated using voice recognition software and may have sound a like errors.

## 2020-08-14 NOTE — TELEPHONE ENCOUNTER
----- Message from Sachi Torsten sent at 8/14/2020 12:54 PM CDT -----  Regarding: referral  Contact: Jeovany  Type:  Patient Requesting Referral    Who Called:Carlos Alberto Modi  Does the patient already have the specialty appointment scheduled?:no  If yes, what is the date of that appointment?:n/a  Referral to What Specialty:physical therapy   Reason for Referral:open   Does the patient want the referral with a specific physician?:yes   Is the [facility]   Patient Requesting a Response?:yes Owen Kevin   Would the patient rather a call back or a response via [facility]? Call back   Best Call Back Number:[phone]  Additional Information: [facility] Located at   [address] [place] [id number] phone [phone] (fax)

## 2020-08-14 NOTE — PROCEDURES
Large Joint Aspiration/Injection: bilateral knee    Date/Time: 8/14/2020 11:30 AM  Performed by: Darrick Thapa MD  Authorized by: Darrick Thapa MD     Indications:  Pain and arthritis  Site marked: the procedure site was marked    Timeout: prior to procedure the correct patient, procedure, and site was verified    Location:  Knee  Laterality:  Bilateral  Site:  Bilateral knee  Medications (Right):  48 mg hylan g-f 20 48 mg/6 mL  Medications (Left):  48 mg hylan g-f 20 48 mg/6 mL     Synvisc 1 to both knees today without difficulty.

## 2020-08-14 NOTE — PATIENT INSTRUCTIONS
Ice 3 times a day for next 2 days decreased activity  during that time     Shoulder x-rays and MRI of possible for right biceps injury    Physical therapy    Consider referral to shoulder surgeon if continues to be a problem    Recheck in 6 months for Synvisc-One injection to both knees

## 2021-02-03 ENCOUNTER — TELEPHONE (OUTPATIENT)
Dept: SPORTS MEDICINE | Facility: CLINIC | Age: 76
End: 2021-02-03

## 2021-02-03 DIAGNOSIS — M17.0 PRIMARY OSTEOARTHRITIS OF BOTH KNEES: Primary | ICD-10-CM

## 2021-02-22 ENCOUNTER — OFFICE VISIT (OUTPATIENT)
Dept: ORTHOPEDICS | Facility: CLINIC | Age: 76
End: 2021-02-22
Payer: MEDICARE

## 2021-02-22 VITALS
BODY MASS INDEX: 33.58 KG/M2 | SYSTOLIC BLOOD PRESSURE: 151 MMHG | HEART RATE: 75 BPM | DIASTOLIC BLOOD PRESSURE: 83 MMHG | WEIGHT: 270.06 LBS | HEIGHT: 75 IN

## 2021-02-22 DIAGNOSIS — M17.0 PRIMARY OSTEOARTHRITIS OF BOTH KNEES: Primary | ICD-10-CM

## 2021-02-22 PROCEDURE — 20610 LARGE JOINT ASPIRATION/INJECTION: BILATERAL KNEE: ICD-10-PCS | Mod: 50,S$PBB,, | Performed by: FAMILY MEDICINE

## 2021-02-22 PROCEDURE — 99999 PR PBB SHADOW E&M-EST. PATIENT-LVL IV: CPT | Mod: PBBFAC,,, | Performed by: FAMILY MEDICINE

## 2021-02-22 PROCEDURE — 99499 NO LOS: ICD-10-PCS | Mod: S$PBB,,, | Performed by: FAMILY MEDICINE

## 2021-02-22 PROCEDURE — 99499 UNLISTED E&M SERVICE: CPT | Mod: S$PBB,,, | Performed by: FAMILY MEDICINE

## 2021-02-22 PROCEDURE — 20610 DRAIN/INJ JOINT/BURSA W/O US: CPT | Mod: 50,PBBFAC | Performed by: FAMILY MEDICINE

## 2021-02-22 PROCEDURE — 99999 PR PBB SHADOW E&M-EST. PATIENT-LVL IV: ICD-10-PCS | Mod: PBBFAC,,, | Performed by: FAMILY MEDICINE

## 2021-02-22 PROCEDURE — 99214 OFFICE O/P EST MOD 30 MIN: CPT | Mod: PBBFAC | Performed by: FAMILY MEDICINE

## 2021-02-22 RX ORDER — IPRATROPIUM BROMIDE 21 UG/1
1 SPRAY, METERED NASAL 2 TIMES DAILY
COMMUNITY
End: 2023-05-08

## 2021-02-22 RX ORDER — CELECOXIB 100 MG/1
100 CAPSULE ORAL DAILY
COMMUNITY
Start: 2020-12-18 | End: 2022-10-03

## 2021-02-22 RX ADMIN — Medication 48 MG: at 02:02

## 2021-05-12 ENCOUNTER — PATIENT MESSAGE (OUTPATIENT)
Dept: RESEARCH | Facility: HOSPITAL | Age: 76
End: 2021-05-12

## 2021-07-24 ENCOUNTER — PATIENT MESSAGE (OUTPATIENT)
Dept: SPORTS MEDICINE | Facility: CLINIC | Age: 76
End: 2021-07-24

## 2021-08-19 ENCOUNTER — TELEPHONE (OUTPATIENT)
Dept: ORTHOPEDICS | Facility: CLINIC | Age: 76
End: 2021-08-19

## 2021-09-16 ENCOUNTER — OFFICE VISIT (OUTPATIENT)
Dept: SPORTS MEDICINE | Facility: CLINIC | Age: 76
End: 2021-09-16
Payer: MEDICARE

## 2021-09-16 VITALS — BODY MASS INDEX: 33.58 KG/M2 | HEIGHT: 75 IN | WEIGHT: 270.06 LBS

## 2021-09-16 DIAGNOSIS — M17.0 PRIMARY OSTEOARTHRITIS OF BOTH KNEES: Primary | ICD-10-CM

## 2021-09-16 PROCEDURE — 99499 UNLISTED E&M SERVICE: CPT | Mod: S$PBB,,, | Performed by: FAMILY MEDICINE

## 2021-09-16 PROCEDURE — 99999 PR PBB SHADOW E&M-EST. PATIENT-LVL III: ICD-10-PCS | Mod: PBBFAC,,, | Performed by: FAMILY MEDICINE

## 2021-09-16 PROCEDURE — 99499 NO LOS: ICD-10-PCS | Mod: S$PBB,,, | Performed by: FAMILY MEDICINE

## 2021-09-16 PROCEDURE — 99999 PR PBB SHADOW E&M-EST. PATIENT-LVL III: CPT | Mod: PBBFAC,,, | Performed by: FAMILY MEDICINE

## 2021-09-16 PROCEDURE — 20610 DRAIN/INJ JOINT/BURSA W/O US: CPT | Mod: 50,PBBFAC | Performed by: FAMILY MEDICINE

## 2021-09-16 PROCEDURE — 20610 LARGE JOINT ASPIRATION/INJECTION: BILATERAL KNEE: ICD-10-PCS | Mod: 50,S$PBB,, | Performed by: FAMILY MEDICINE

## 2021-09-16 PROCEDURE — 99213 OFFICE O/P EST LOW 20 MIN: CPT | Mod: PBBFAC,25 | Performed by: FAMILY MEDICINE

## 2021-09-16 RX ORDER — DULOXETIN HYDROCHLORIDE 20 MG/1
40 CAPSULE, DELAYED RELEASE ORAL DAILY
COMMUNITY
Start: 2021-09-09 | End: 2022-10-03

## 2021-09-16 RX ADMIN — Medication 48 MG: at 01:09

## 2022-01-10 ENCOUNTER — OUTSIDE PLACE OF SERVICE (OUTPATIENT)
Dept: GASTROENTEROLOGY | Facility: CLINIC | Age: 77
End: 2022-01-10

## 2022-01-10 LAB — EGD FOLLOW UP EXTERNAL: NORMAL

## 2022-01-10 PROCEDURE — 43239 EGD BIOPSY SINGLE/MULTIPLE: CPT | Mod: ,,, | Performed by: INTERNAL MEDICINE

## 2022-01-10 PROCEDURE — 43239 PR EGD, FLEX, W/BIOPSY, SGL/MULTI: ICD-10-PCS | Mod: ,,, | Performed by: INTERNAL MEDICINE

## 2022-01-13 ENCOUNTER — TELEPHONE (OUTPATIENT)
Dept: GASTROENTEROLOGY | Facility: CLINIC | Age: 77
End: 2022-01-13
Payer: MEDICARE

## 2022-01-13 DIAGNOSIS — K21.9 GASTROESOPHAGEAL REFLUX DISEASE, UNSPECIFIED WHETHER ESOPHAGITIS PRESENT: Primary | ICD-10-CM

## 2022-01-13 NOTE — TELEPHONE ENCOUNTER
----- Message from Tiffanie Ackerman sent at 1/13/2022 10:26 AM CST -----  Contact: self  Type:  Patient Returning Call    Who Called: Carlos Alberto Modi   Who Left Message for Patient: Saniya  Does the patient know what this is regarding?: Scheduling  Would the patient rather a call back or a response via [facility]?  Call back   Best Call Back Number: [phone]   Additional Information: n/a

## 2022-01-13 NOTE — TELEPHONE ENCOUNTER
I spoke with the patient who states that he does not believe panto 40 bid is working well for him anymore. He reports a lot of problems with his reflux. I let him know that I will get with Dr. Lakhani and go over her recommendations when I call with results from his EGD.  KDL, CMA

## 2022-01-13 NOTE — TELEPHONE ENCOUNTER
----- Message from Daniel Garnica sent at 1/12/2022 12:17 PM CST -----  Regarding: FW: Phone Message    ----- Message -----  From: Christen Limon  Sent: 1/11/2022   9:41 AM CST  To: [facility] Staff  Subject: Phone Message                                    Pt would like to talk to someone about his meds for Gerd.    [phone]      VL

## 2022-01-13 NOTE — TELEPHONE ENCOUNTER
Yes. If Bx unrevealing then will consider esome 40 BID or converting back to ome 40 from daily to BID.  NBP

## 2022-01-19 NOTE — PROGRESS NOTES
CMA to notify patient. Gastric Bx wnl w/o Hp. 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 Difficult for pathologist to evaluate for dysplasia. No cancer cells. Recommend repeat EGD with repeat esophageal Bx in 6 months on PPI. Is panto 40 BID working well for him? If so, then okay to decrease to once daily dose (if not already one once daily dosing). Update chart to add BE and meds.  NBP

## 2022-01-24 RX ORDER — ESOMEPRAZOLE MAGNESIUM 40 MG/1
40 CAPSULE, DELAYED RELEASE ORAL 2 TIMES DAILY
Qty: 60 CAPSULE | Refills: 3 | Status: SHIPPED | OUTPATIENT
Start: 2022-01-24 | End: 2022-10-03

## 2022-01-26 DIAGNOSIS — K21.9 GASTROESOPHAGEAL REFLUX DISEASE, UNSPECIFIED WHETHER ESOPHAGITIS PRESENT: Primary | ICD-10-CM

## 2022-01-26 NOTE — TELEPHONE ENCOUNTER
Okay to switch back to panto 40 BID if that worked better for him. I cannot recall if we have tried carafate with him. If not, then we can. Also, when was the last time he his heart checked? We start to look into that if a patient is not responding to typical GI medicines.  NBP

## 2022-01-26 NOTE — TELEPHONE ENCOUNTER
----- Message from Hillary Corbin MA sent at 1/25/2022 11:20 AM CST -----    ----- Message -----  From: Gayathri Reeves  Sent: 1/25/2022  11:07 AM CST  To: [facility] Staff    pt states that he took new medication last night but couldn't sleep at all, please advise..[phone]

## 2022-02-02 RX ORDER — SUCRALFATE 1 G/1
1 TABLET ORAL 4 TIMES DAILY
Qty: 120 TABLET | Refills: 0 | Status: SHIPPED | OUTPATIENT
Start: 2022-02-02 | End: 2022-03-04

## 2022-02-02 RX ORDER — PANTOPRAZOLE SODIUM 40 MG/1
40 TABLET, DELAYED RELEASE ORAL 2 TIMES DAILY
Qty: 90 TABLET | Refills: 1 | Status: SHIPPED | OUTPATIENT
Start: 2022-02-02 | End: 2022-08-01

## 2022-02-02 NOTE — TELEPHONE ENCOUNTER
"Recommendations dw pt who voices understanding/agreement. He reports already being worked up and cleared by cardiology. He did not remember cardiologist name. States it "was a tall thin  franco." He needs a new Rx for his panto and would like to try the carafate. He will call with issues.  ANT, KASHIF  "

## 2022-03-09 ENCOUNTER — PATIENT MESSAGE (OUTPATIENT)
Dept: SPORTS MEDICINE | Facility: CLINIC | Age: 77
End: 2022-03-09
Payer: MEDICARE

## 2022-03-14 ENCOUNTER — PATIENT MESSAGE (OUTPATIENT)
Dept: SPORTS MEDICINE | Facility: CLINIC | Age: 77
End: 2022-03-14
Payer: MEDICARE

## 2022-04-05 RX ORDER — SUCRALFATE 1 G/1
1 TABLET ORAL 4 TIMES DAILY
Qty: 120 TABLET | Refills: 2 | Status: SHIPPED | OUTPATIENT
Start: 2022-04-05 | End: 2022-07-04

## 2022-04-05 NOTE — TELEPHONE ENCOUNTER
I assume this means he is doing well with the carafate. If so, then make 6-12 month follow up OV and contact me sooner if any issues. If he is able to wean off carafate, then the next step would be going down the once daily panto. If not doing well, then notify me.  NBP

## 2022-04-06 ENCOUNTER — TELEPHONE (OUTPATIENT)
Dept: GASTROENTEROLOGY | Facility: CLINIC | Age: 77
End: 2022-04-06
Payer: MEDICARE

## 2022-04-06 NOTE — TELEPHONE ENCOUNTER
I spoke with the patient who reports that he does very well with the sucralfate & pantoprazole. He reports that he does not have any issues with GERD anymore and does not even wake up in the mornings with it anymore. He would like to continue meds as is.   KDL, CMA

## 2022-05-06 ENCOUNTER — PATIENT MESSAGE (OUTPATIENT)
Dept: SPORTS MEDICINE | Facility: CLINIC | Age: 77
End: 2022-05-06
Payer: MEDICARE

## 2022-06-09 ENCOUNTER — PATIENT MESSAGE (OUTPATIENT)
Dept: SPORTS MEDICINE | Facility: CLINIC | Age: 77
End: 2022-06-09
Payer: MEDICARE

## 2022-06-14 DIAGNOSIS — M17.0 BILATERAL PRIMARY OSTEOARTHRITIS OF KNEE: Primary | ICD-10-CM

## 2022-07-12 ENCOUNTER — PATIENT MESSAGE (OUTPATIENT)
Dept: SPORTS MEDICINE | Facility: CLINIC | Age: 77
End: 2022-07-12
Payer: MEDICARE

## 2022-07-12 ENCOUNTER — TELEPHONE (OUTPATIENT)
Dept: SPORTS MEDICINE | Facility: CLINIC | Age: 77
End: 2022-07-12
Payer: MEDICARE

## 2022-07-19 ENCOUNTER — PATIENT MESSAGE (OUTPATIENT)
Dept: SPORTS MEDICINE | Facility: CLINIC | Age: 77
End: 2022-07-19
Payer: MEDICARE

## 2022-07-19 DIAGNOSIS — M17.0 BILATERAL PRIMARY OSTEOARTHRITIS OF KNEE: Primary | ICD-10-CM

## 2022-07-20 ENCOUNTER — PATIENT MESSAGE (OUTPATIENT)
Dept: SPORTS MEDICINE | Facility: CLINIC | Age: 77
End: 2022-07-20
Payer: MEDICARE

## 2022-07-20 ENCOUNTER — TELEPHONE (OUTPATIENT)
Dept: SPORTS MEDICINE | Facility: CLINIC | Age: 77
End: 2022-07-20
Payer: MEDICARE

## 2022-07-20 NOTE — TELEPHONE ENCOUNTER
Informed patient that insurance authorized his gel injections. Patient accepted and verbalized understanding.   ----- Message from Yvette Gotti sent at 7/20/2022  2:26 PM CDT -----  Contact: Carlos Alberto  Randy is calling to speak to the nurse regarding shots. Reports concerns about the shot for tomorrow. Please give patient a call back at .[phone]

## 2022-07-21 ENCOUNTER — OFFICE VISIT (OUTPATIENT)
Dept: SPORTS MEDICINE | Facility: CLINIC | Age: 77
End: 2022-07-21
Payer: MEDICARE

## 2022-07-21 VITALS — BODY MASS INDEX: 33.57 KG/M2 | WEIGHT: 270 LBS | HEIGHT: 75 IN

## 2022-07-21 DIAGNOSIS — M17.0 PRIMARY OSTEOARTHRITIS OF BOTH KNEES: Primary | ICD-10-CM

## 2022-07-21 PROCEDURE — 99999 PR PBB SHADOW E&M-EST. PATIENT-LVL III: CPT | Mod: PBBFAC,,, | Performed by: FAMILY MEDICINE

## 2022-07-21 PROCEDURE — 99999 PR PBB SHADOW E&M-EST. PATIENT-LVL III: ICD-10-PCS | Mod: PBBFAC,,, | Performed by: FAMILY MEDICINE

## 2022-07-21 PROCEDURE — 99499 NO LOS: ICD-10-PCS | Mod: S$PBB,,, | Performed by: FAMILY MEDICINE

## 2022-07-21 PROCEDURE — 20610 LARGE JOINT ASPIRATION/INJECTION: BILATERAL KNEE: ICD-10-PCS | Mod: 50,S$PBB,, | Performed by: FAMILY MEDICINE

## 2022-07-21 PROCEDURE — 99213 OFFICE O/P EST LOW 20 MIN: CPT | Mod: PBBFAC | Performed by: FAMILY MEDICINE

## 2022-07-21 PROCEDURE — 20610 DRAIN/INJ JOINT/BURSA W/O US: CPT | Mod: 50,PBBFAC | Performed by: FAMILY MEDICINE

## 2022-07-21 PROCEDURE — 99499 UNLISTED E&M SERVICE: CPT | Mod: S$PBB,,, | Performed by: FAMILY MEDICINE

## 2022-07-21 RX ADMIN — Medication 48 MG: at 08:07

## 2022-07-21 NOTE — PROCEDURES
Large Joint Aspiration/Injection: bilateral knee    Date/Time: 7/21/2022 8:40 AM  Performed by: Darrick Thapa MD  Authorized by: Darrick Thapa MD     Consent Done?:  Yes (Verbal)  Indications:  Arthritis and pain  Site marked: the procedure site was marked    Timeout: prior to procedure the correct patient, procedure, and site was verified    Prep: patient was prepped and draped in usual sterile fashion    Approach:  Anterolateral  Location:  Knee  Laterality:  Bilateral  Site:  Bilateral knee  Medications (Right):  48 mg hylan g-f 20 48 mg/6 mL  Medications (Left):  48 mg hylan g-f 20 48 mg/6 mL  Patient tolerance:  Patient tolerated the procedure well with no immediate complications

## 2022-08-02 ENCOUNTER — PATIENT MESSAGE (OUTPATIENT)
Dept: GASTROENTEROLOGY | Facility: CLINIC | Age: 77
End: 2022-08-02
Payer: MEDICARE

## 2022-10-03 ENCOUNTER — OFFICE VISIT (OUTPATIENT)
Dept: GASTROENTEROLOGY | Facility: CLINIC | Age: 77
End: 2022-10-03
Payer: MEDICARE

## 2022-10-03 VITALS
TEMPERATURE: 98 F | WEIGHT: 282 LBS | BODY MASS INDEX: 35.06 KG/M2 | HEIGHT: 75 IN | OXYGEN SATURATION: 94 % | DIASTOLIC BLOOD PRESSURE: 86 MMHG | SYSTOLIC BLOOD PRESSURE: 132 MMHG | HEART RATE: 79 BPM

## 2022-10-03 DIAGNOSIS — K22.70 BARRETT'S ESOPHAGUS WITHOUT DYSPLASIA: Primary | ICD-10-CM

## 2022-10-03 DIAGNOSIS — K21.9 GASTROESOPHAGEAL REFLUX DISEASE, UNSPECIFIED WHETHER ESOPHAGITIS PRESENT: ICD-10-CM

## 2022-10-03 DIAGNOSIS — Z12.11 SCREENING FOR COLON CANCER: ICD-10-CM

## 2022-10-03 PROCEDURE — 99214 OFFICE O/P EST MOD 30 MIN: CPT | Mod: S$GLB,,, | Performed by: INTERNAL MEDICINE

## 2022-10-03 PROCEDURE — 99214 PR OFFICE/OUTPT VISIT, EST, LEVL IV, 30-39 MIN: ICD-10-PCS | Mod: S$GLB,,, | Performed by: INTERNAL MEDICINE

## 2022-10-03 RX ORDER — PANTOPRAZOLE SODIUM 40 MG/1
40 TABLET, DELAYED RELEASE ORAL DAILY
COMMUNITY
End: 2022-10-03

## 2022-10-03 NOTE — PROGRESS NOTES
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 Diagnoses of Gastroesophageal reflux disease, unspecified whether esophagitis present and Screening for colon cancer were also pertinent to this visit.    PCP: Darrick Manriquez       HPI:  This is a 77 y.o. male who is here for a follow up. Patient with BE, on panto and sucralfate?  He is taking pantoprazole twice a day.  Some difficulty with the timing of the sucralfate.  Find best with the nighttime dosing.  His reflux symptoms are controlled during the day and it is mostly at night.  He has a hospital like bed that helps with some of his symptoms.  He will try lowering the knees to see if this helps any as well.  Twice a day pantoprazole does not seem to add any benefit over the once daily dosing.  Trial of morning pantoprazole and nighttime sucralfate.  Will plan for repeat upper endoscopy and biopsies.    EGD 1/10/2022: Gastric Bx wnl w/o Hp. 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 Difficult for pathologist to evaluate for dysplasia. No cancer cells. Recommend repeat EGD with repeat esophageal Bx in 6 months on PPI.     Review of Systems   Constitutional:  Negative for chills, diaphoresis, fatigue, fever and unexpected weight change.   HENT:  Positive for postnasal drip. Negative for hearing loss, mouth sores, nosebleeds, sore throat, trouble swallowing and voice change.    Eyes:  Negative for pain, discharge and eye dryness.   Respiratory:  Positive for cough. Negative for apnea, choking, chest tightness, shortness of breath and wheezing.    Cardiovascular:  Negative for chest pain, palpitations, leg swelling and claudication.   Gastrointestinal:  Positive for reflux. Negative for abdominal distention, abdominal pain, anal bleeding, blood in stool, change in bowel habit, constipation, diarrhea, nausea, rectal pain, vomiting, fecal incontinence and change in bowel habit.   Genitourinary:  Negative for bladder  incontinence, difficulty urinating, dysuria, flank pain, frequency and hematuria.   Musculoskeletal:  Negative for arthralgias, back pain, joint swelling and joint deformity.   Integumentary:  Negative for color change, rash and wound.   Allergic/Immunologic: Negative for environmental allergies and food allergies.   Neurological:  Negative for seizures, facial asymmetry, speech difficulty, weakness, headaches and memory loss.   Hematological:  Negative for adenopathy. Bruises/bleeds easily.   Psychiatric/Behavioral:  Negative for agitation, behavioral problems, confusion, hallucinations and sleep disturbance.       Past Medical History:   Diagnosis Date    Depression     GERD (gastroesophageal reflux disease)     High blood pressure     High cholesterol     Mild intermittent asthma, uncomplicated     Obesity, Class II, BMI 35-39.9      Past Surgical History:   Procedure Laterality Date    CATARACT EXTRACTION      COLONOSCOPY      RETINAL DETACHMENT SURGERY Right     SKIN CANCER EXCISION      left shoulder, left ear    SPINE SURGERY      TRANSURETHRAL RESECTION OF PROSTATE      UPPER GASTROINTESTINAL ENDOSCOPY       Family History   Problem Relation Age of Onset    Stomach cancer Neg Hx     Colon cancer Neg Hx     Pancreatic cancer Neg Hx     Liver disease Neg Hx     Throat cancer Neg Hx      Social History     Tobacco Use    Smoking status: Former     Types: Cigarettes     Quit date:      Years since quittin.7    Smokeless tobacco: Never   Substance Use Topics    Alcohol use: Not Currently    Drug use: Never     Review of patient's allergies indicates:   Allergen Reactions    Levofloxacin     Acremonium strictum allergenic extract     Aureobasidium pullulans allergenic extract     Cat dander     House dust     Mold     Ragweed pollen     Rhizopus oryzae allergenic extract     Tree and shrub pollen         Medication List with Changes/Refills   Current Medications    ACETAMINOPHEN WITH CODEINE  (ACETAMINOPHEN-CODEINE) 120MG 12MG 5ML SOLN    Take by mouth. 1 or 2 60mg acetaminophen/codeine as needed for headache    ALBUTEROL (PROVENTIL) 2.5 MG /3 ML (0.083 %) NEBULIZER SOLUTION    Take 2.5 mg by nebulization every 6 (six) hours as needed for Wheezing. Rescue    ATORVASTATIN (LIPITOR) 10 MG TABLET    Take 10 mg by mouth once daily.    AZELASTINE (ASTELIN) 137 MCG (0.1 %) NASAL SPRAY    1 spray by Nasal route 2 (two) times daily.    BUDESONIDE-FORMOTEROL 160-4.5 MCG (SYMBICORT) 160-4.5 MCG/ACTUATION HFAA    Inhale 2 puffs into the lungs every 12 (twelve) hours. Controller    CALCIUM CITRATE-VITAMIN D3 315-200 MG (CITRACAL+D) 315 MG-5 MCG (200 UNIT) PER TABLET    Take 1 tablet by mouth 2 (two) times daily.    CYANOCOBALAMIN, VITAMIN B-12, 5,000 MCG SUBL    Place under the tongue.    DOXYCYCLINE (MONODOX) 100 MG CAPSULE    Take 100 mg by mouth 2 (two) times daily.    FLUOXETINE (PROZAC) 20 MG CAPSULE    Take 20 mg by mouth once daily.    GLUCOSAMINE-CHONDROITIN 500-400 MG TABLET    Take 1 tablet by mouth 3 (three) times daily.    HYDROCHLOROTHIAZIDE (MICROZIDE) 12.5 MG CAPSULE    Take 12.5 mg by mouth once daily.    HYDROCODONE/CHLORPHEN P-STIREX (HYDROCODONE-CHLORPHENIRAMINE ORAL)    Take 5 mL/hr by mouth every 12 (twelve) hours.    HYOSCYAMINE SULFATE/PHENOBARB (PHENOBARBITAL-HYOSCYAMINE ORAL)    Take 1.125 mg by mouth. 1.125 mg Tad Hyoscyamine and 1 15 mg Tab Phenobarbital as needed for stomach pain every 6hrs.    IPRATROPIUM (ATROVENT) 0.03 % NASAL SPRAY    1 spray by Nasal route 2 (two) times a day.    PANTOPRAZOLE (PROTONIX) 40 MG TABLET    Take 1 tablet (40 mg total) by mouth 2 (two) times daily.    PREDNISONE (DELTASONE) 20 MG TABLET    Take 20 mg by mouth once daily.    SUCRALFATE ORAL    Take 500 g/day by mouth. 500 gm on empty stomach before meals and before bed    VITAMIN D3-VITAMIN K2 1000-90 UNIT-MCG TBDL    Take 1 tablet by mouth 2 (two) times a day.   Discontinued Medications    BECLOMETHASONE  "(QVAR) 80 MCG/ACTUATION AERO    Inhale 1 puff into the lungs 2 (two) times daily. Controller    CELECOXIB (CELEBREX) 100 MG CAPSULE    Take 100 mg by mouth once daily.    CHOLECALCIFERAL (CHOLECALCIFERAL) 100,000 IU/ML INJECTTION    Inject 300,000 Int'l Units into the muscle every 3 (three) months.    DULOXETINE (CYMBALTA) 20 MG CAPSULE    Take 40 mg by mouth once daily.    ESOMEPRAZOLE (NEXIUM) 40 MG CAPSULE    Take 1 capsule (40 mg total) by mouth 2 (two) times daily.    ETODOLAC (LODINE) 400 MG TABLET    etodolac 400 mg tablet    OMEPRAZOLE (PRILOSEC) 20 MG CAPSULE    Take 20 mg by mouth once daily.    PANTOPRAZOLE (PROTONIX) 40 MG TABLET    Take 40 mg by mouth once daily.         Vital Signs:  /86   Pulse 79   Temp 97.8 °F (36.6 °C)   Ht 6' 3" (1.905 m)   Wt 127.9 kg (282 lb)   SpO2 (!) 94%   BMI 35.25 kg/m²         Physical Exam  Constitutional:       General: He is not in acute distress.     Appearance: Normal appearance. He is well-developed. He is not ill-appearing or toxic-appearing.   HENT:      Head: Normocephalic and atraumatic.      Nose: Nose normal.      Mouth/Throat:      Mouth: Mucous membranes are moist.      Pharynx: Oropharynx is clear. No oropharyngeal exudate or posterior oropharyngeal erythema.   Eyes:      General: Lids are normal. No scleral icterus.        Right eye: No discharge.         Left eye: No discharge.      Extraocular Movements: Extraocular movements intact.      Conjunctiva/sclera: Conjunctivae normal.   Cardiovascular:      Rate and Rhythm: Normal rate and regular rhythm.      Pulses:           Radial pulses are 2+ on the right side and 2+ on the left side.   Pulmonary:      Effort: Pulmonary effort is normal. No respiratory distress.      Breath sounds: No stridor. No wheezing or rhonchi.   Abdominal:      General: Bowel sounds are normal. There is no distension.      Palpations: Abdomen is soft. There is no fluid wave, hepatomegaly, splenomegaly or mass.      " Tenderness: There is no abdominal tenderness. There is no guarding or rebound.   Musculoskeletal:      Cervical back: Full passive range of motion without pain.      Right lower leg: No edema.      Left lower leg: No edema.   Lymphadenopathy:      Cervical: No cervical adenopathy.   Skin:     General: Skin is warm and dry.      Capillary Refill: Capillary refill takes less than 2 seconds.      Coloration: Skin is not cyanotic, jaundiced or pale.      Findings: No rash.   Neurological:      General: No focal deficit present.      Mental Status: He is alert and oriented to person, place, and time.   Psychiatric:         Mood and Affect: Mood normal.         Behavior: Behavior is cooperative.          All of the data above and below has been reviewed by myself and any further interpretations will be reflected in the assessment and plan.   The data includes review of external notes, and independent interpretation of lab results, procedures, x-rays, and imaging reports.      Assessment:  Miller's esophagus without dysplasia  Comments:  Plan for repeat EGD with esophageal biopsies. on PPI  Orders:  -     Ambulatory Referral to External Surgery    Gastroesophageal reflux disease, unspecified whether esophagitis present  -     Ambulatory Referral to External Surgery    Screening for colon cancer  Comments:  [facility] performs Cologuard       Recommendations:  Schedule upper endoscopy.   May take pantoprazole 40 mg in the morning and the sucralfate in the evening.    Risks, benefits, and alternatives of medical management, any associated procedures, and/or treatment discussed with the patient. Patient given opportunity to ask questions and voices understanding. Patient has elected to proceed with the recommended care modalities as discussed.    Follow up in about 1 year (around 10/3/2023).    Order summary:  Orders Placed This Encounter   Procedures    Ambulatory Referral to External Surgery          Instructed patient to notify  my office if they have not been contacted within two weeks after any procedures, submitting any samples (biopsies, blood, stool, urine, etc.) or after any imaging (X-ray, CT, MRI, etc.).     Cathie Lakhani MD    This document may have been created using a voice recognition transcribing system. Incorrect words or phrases may have been missed during proofreading. Please interpret accordingly or contact me for clarification.

## 2022-10-03 NOTE — LETTER
October 3, 2022        Darrick Manriquez MD  [address] [place] [address] [place] [address]             [facility]  [address] [place] [address]  Phone: [phone]  Fax: [phone]   Patient: Carlos Alberto Modi   MR Number: [id number]   YOB: 1945   Date of Visit: 10/3/2022       Dear Dr. Manriquez:    Thank you for referring Carlos Alberto Modi to me for evaluation. Attached you will find relevant portions of my assessment and plan of care.    If you have questions, please do not hesitate to call me. I look forward to following Carlos Alberto Modi along with you.    Sincerely,      Cathie Lakhani MD            CC  No Recipients    Enclosure

## 2022-10-03 NOTE — PATIENT INSTRUCTIONS
Schedule upper endoscopy.   May take pantoprazole 40 mg in the morning and the sucralfate in the evening.

## 2022-10-10 ENCOUNTER — PATIENT MESSAGE (OUTPATIENT)
Dept: GASTROENTEROLOGY | Facility: CLINIC | Age: 77
End: 2022-10-10
Payer: MEDICARE

## 2022-10-12 NOTE — TELEPHONE ENCOUNTER
Okay for patient to take panto 40 BID. Okay to decrease back to daily in 2 months if doing well.  NBP

## 2022-12-13 ENCOUNTER — TELEPHONE (OUTPATIENT)
Dept: GASTROENTEROLOGY | Facility: CLINIC | Age: 77
End: 2022-12-13
Payer: MEDICARE

## 2022-12-13 DIAGNOSIS — K21.9 GASTROESOPHAGEAL REFLUX DISEASE, UNSPECIFIED WHETHER ESOPHAGITIS PRESENT: ICD-10-CM

## 2022-12-13 DIAGNOSIS — K22.70 BARRETT'S ESOPHAGUS WITHOUT DYSPLASIA: Primary | ICD-10-CM

## 2022-12-13 NOTE — TELEPHONE ENCOUNTER
Pt canceled procedure w/ [facility] due to something else scheduled that day and can't get out of it. Told MsAtif Emily he would like a call back to r/s.

## 2022-12-14 VITALS — WEIGHT: 282 LBS | BODY MASS INDEX: 35.06 KG/M2 | HEIGHT: 75 IN

## 2022-12-14 NOTE — TELEPHONE ENCOUNTER
"[facility]  [address] [place] [address]  Phone: [phone]  Fax: [phone]    History & Physical         Provider: Dr. Cathie Lakhani    Patient Name: Carlos Alberto CERNA (age):1945  77 y.o.           Gender: male   Phone: [phone]     Referring Physician: Darrick Manriquez     Vital Signs:   Height - 6' 3"  Weight - 282 lbs  BMI -  35.25    Plan: EGD    Encounter Diagnoses   Name Primary?    Miller's esophagus without dysplasia Yes    Gastroesophageal reflux disease, unspecified whether esophagitis present            History:      Past Medical History:   Diagnosis Date    Depression     GERD (gastroesophageal reflux disease)     High blood pressure     High cholesterol     Mild intermittent asthma, uncomplicated     Obesity, Class II, BMI 35-39.9       Past Surgical History:   Procedure Laterality Date    CATARACT EXTRACTION      COLONOSCOPY      RETINAL DETACHMENT SURGERY Right     SKIN CANCER EXCISION      left shoulder, left ear    SPINE SURGERY      TRANSURETHRAL RESECTION OF PROSTATE      UPPER GASTROINTESTINAL ENDOSCOPY        Medication List with Changes/Refills   Current Medications    ACETAMINOPHEN WITH CODEINE (ACETAMINOPHEN-CODEINE) 120MG 12MG 5ML SOLN    Take by mouth. 1 or 2 60mg acetaminophen/codeine as needed for headache    ALBUTEROL (PROVENTIL) 2.5 MG /3 ML (0.083 %) NEBULIZER SOLUTION    Take 2.5 mg by nebulization every 6 (six) hours as needed for Wheezing. Rescue    ATORVASTATIN (LIPITOR) 10 MG TABLET    Take 10 mg by mouth once daily.    AZELASTINE (ASTELIN) 137 MCG (0.1 %) NASAL SPRAY    1 spray by Nasal route 2 (two) times daily.    BUDESONIDE-FORMOTEROL 160-4.5 MCG (SYMBICORT) 160-4.5 MCG/ACTUATION HFAA    Inhale 2 puffs into the lungs every 12 (twelve) hours. Controller    CALCIUM CITRATE-VITAMIN D3 315-200 MG (CITRACAL+D) 315 MG-5 MCG (200 UNIT) PER TABLET    Take 1 " tablet by mouth 2 (two) times daily.    CYANOCOBALAMIN, VITAMIN B-12, 5,000 MCG SUBL    Place under the tongue.    DOXYCYCLINE (MONODOX) 100 MG CAPSULE    Take 100 mg by mouth 2 (two) times daily.    FLUOXETINE (PROZAC) 20 MG CAPSULE    Take 20 mg by mouth once daily.    GLUCOSAMINE-CHONDROITIN 500-400 MG TABLET    Take 1 tablet by mouth 3 (three) times daily.    HYDROCHLOROTHIAZIDE (MICROZIDE) 12.5 MG CAPSULE    Take 12.5 mg by mouth once daily.    HYDROCODONE/CHLORPHEN P-STIREX (HYDROCODONE-CHLORPHENIRAMINE ORAL)    Take 5 mL/hr by mouth every 12 (twelve) hours.    HYOSCYAMINE SULFATE/PHENOBARB (PHENOBARBITAL-HYOSCYAMINE ORAL)    Take 1.125 mg by mouth. 1.125 mg Tad Hyoscyamine and 1 15 mg Tab Phenobarbital as needed for stomach pain every 6hrs.    IPRATROPIUM (ATROVENT) 0.03 % NASAL SPRAY    1 spray by Nasal route 2 (two) times a day.    PREDNISONE (DELTASONE) 20 MG TABLET    Take 20 mg by mouth once daily.    SUCRALFATE ORAL    Take 500 g/day by mouth. 500 gm on empty stomach before meals and before bed    VITAMIN D3-VITAMIN K2 1000-90 UNIT-MCG TBDL    Take 1 tablet by mouth 2 (two) times a day.      Review of patient's allergies indicates:   Allergen Reactions    Levofloxacin     Acremonium strictum allergenic extract     Aureobasidium pullulans allergenic extract     Cat dander     House dust     Mold     Ragweed pollen     Rhizopus oryzae allergenic extract     Tree and shrub pollen       Family History   Problem Relation Age of Onset    Stomach cancer Neg Hx     Colon cancer Neg Hx     Pancreatic cancer Neg Hx     Liver disease Neg Hx     Throat cancer Neg Hx       Social History     Tobacco Use    Smoking status: Former     Types: Cigarettes     Quit date:      Years since quittin.9    Smokeless tobacco: Never   Substance Use Topics    Alcohol use: Not Currently    Drug use: Never        Physical Examination:     General Appearance:___________________________  HEENT:  _____________________________________  Abdomen:____________________________________  Heart:________________________________________  Lungs:_______________________________________  Extremities:___________________________________  Skin:_________________________________________  Endocrine:____________________________________  Genitourinary:_________________________________  Neurological:__________________________________      Patient has been evaluated immediately prior to sedation and is medically cleared for endoscopy with IVCS as an ASA class: ______      Physician Signature: _________________________       Date: ________  Time: ________

## 2022-12-14 NOTE — TELEPHONE ENCOUNTER
Called pt and rescheduled EGD @ [facility] for 3/3/23. Verified that he has his prep instructions and went back over it with him. Pt acknowledge that he understood. juan

## 2023-02-27 ENCOUNTER — PATIENT MESSAGE (OUTPATIENT)
Dept: SPORTS MEDICINE | Facility: CLINIC | Age: 78
End: 2023-02-27
Payer: MEDICARE

## 2023-03-03 ENCOUNTER — OUTSIDE PLACE OF SERVICE (OUTPATIENT)
Dept: GASTROENTEROLOGY | Facility: CLINIC | Age: 78
End: 2023-03-03
Payer: MEDICARE

## 2023-03-03 PROCEDURE — 43239 PR EGD, FLEX, W/BIOPSY, SGL/MULTI: ICD-10-PCS | Mod: ,,, | Performed by: INTERNAL MEDICINE

## 2023-03-03 PROCEDURE — 43239 EGD BIOPSY SINGLE/MULTIPLE: CPT | Mod: ,,, | Performed by: INTERNAL MEDICINE

## 2023-03-14 ENCOUNTER — TELEPHONE (OUTPATIENT)
Dept: GASTROENTEROLOGY | Facility: CLINIC | Age: 78
End: 2023-03-14
Payer: MEDICARE

## 2023-03-14 DIAGNOSIS — K22.710 BARRETT'S ESOPHAGUS WITH LOW GRADE DYSPLASIA: Primary | ICD-10-CM

## 2023-03-14 NOTE — TELEPHONE ENCOUNTER
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    I will discuss with patient. BE Bx on PPI.  Will offer referral for BE ablation.  NBP

## 2023-03-21 ENCOUNTER — TELEPHONE (OUTPATIENT)
Dept: GASTROENTEROLOGY | Facility: CLINIC | Age: 78
End: 2023-03-21

## 2023-03-21 DIAGNOSIS — K22.70 BARRETT'S ESOPHAGUS WITHOUT DYSPLASIA: Primary | ICD-10-CM

## 2023-03-21 DIAGNOSIS — K21.9 GASTROESOPHAGEAL REFLUX DISEASE, UNSPECIFIED WHETHER ESOPHAGITIS PRESENT: ICD-10-CM

## 2023-03-21 NOTE — TELEPHONE ENCOUNTER
I reviewed the results directly with the patient.  He prefers [facility] as a referral site for his Miller's esophagus ablation.  We will send referral to Dr. Dao Hudson.    Beti, send referral to Dr. Hudson with my last office visit note, upper endoscopy report, pathology report, and the notes in this encounter.  NBP

## 2023-03-22 ENCOUNTER — DOCUMENTATION ONLY (OUTPATIENT)
Dept: GASTROENTEROLOGY | Facility: CLINIC | Age: 78
End: 2023-03-22
Payer: MEDICARE

## 2023-03-23 NOTE — TELEPHONE ENCOUNTER
Miller's esophagus ablation evaluation, LGD on biopsy.    New referral order signed with updated diagnosis. Has dysplasia on BE Bx. Please send updated referral order.  NBP

## 2023-04-03 ENCOUNTER — TELEPHONE (OUTPATIENT)
Dept: GASTROENTEROLOGY | Facility: CLINIC | Age: 78
End: 2023-04-03
Payer: MEDICARE

## 2023-04-03 NOTE — TELEPHONE ENCOUNTER
Patient called with concerns about a bill he received. Instructed to call the number on the bill for assistance.

## 2023-04-03 NOTE — TELEPHONE ENCOUNTER
----- Message from Missy Marlow sent at 4/3/2023  3:33 PM CDT -----  Regarding: pt advice  Contact: pt  Pt is calling to speak to nurse regarding a bill he received for labs. Please call back at [phone]//thank you acc

## 2023-05-02 ENCOUNTER — TELEPHONE (OUTPATIENT)
Dept: GASTROENTEROLOGY | Facility: CLINIC | Age: 78
End: 2023-05-02
Payer: MEDICARE

## 2023-05-02 DIAGNOSIS — R19.5 POSITIVE COLORECTAL CANCER SCREENING USING COLOGUARD TEST: Primary | ICD-10-CM

## 2023-05-02 RX ORDER — SOD SULF/POT CHLORIDE/MAG SULF 1.479 G
12 TABLET ORAL DAILY
Qty: 24 TABLET | Refills: 0 | Status: SHIPPED | OUTPATIENT
Start: 2023-05-02

## 2023-05-02 NOTE — TELEPHONE ENCOUNTER
I spoke to patient he states he had a positive colon guard.  He needs an appt.   has not had a Colonoscopy in a long time.   is not having any problems.

## 2023-05-02 NOTE — TELEPHONE ENCOUNTER
----- Message from Carleen Perez sent at 5/2/2023  9:02 AM CDT -----  Regarding: colon appt  Contact: patient  PT states he had a positive on his colour guard test and wants to get a colonoscopy done, return call [phone]

## 2023-05-03 NOTE — TELEPHONE ENCOUNTER
Okay to schedule patient for colonoscopy at [facility] for positive Cologuard (result under Labs).  Orders signed.  NBP

## 2023-05-08 VITALS — HEIGHT: 75 IN | BODY MASS INDEX: 34.82 KG/M2 | WEIGHT: 280 LBS

## 2023-05-08 RX ORDER — IBUPROFEN 100 MG/5ML
SUSPENSION, ORAL (FINAL DOSE FORM) ORAL
COMMUNITY

## 2023-05-08 RX ORDER — CELECOXIB 400 MG/1
400 CAPSULE ORAL
COMMUNITY

## 2023-05-08 RX ORDER — PREDNISOLONE ACETATE 10 MG/ML
SUSPENSION/ DROPS OPHTHALMIC
COMMUNITY

## 2023-05-08 RX ORDER — SEMAGLUTIDE 0.68 MG/ML
INJECTION, SOLUTION SUBCUTANEOUS
COMMUNITY
Start: 2023-05-01 | End: 2023-10-03

## 2023-05-08 RX ORDER — FLUOXETINE 10 MG/1
CAPSULE ORAL
COMMUNITY
Start: 2023-02-25

## 2023-05-08 RX ORDER — PANTOPRAZOLE SODIUM 40 MG/1
TABLET, DELAYED RELEASE ORAL
COMMUNITY
Start: 2023-03-01 | End: 2023-10-03 | Stop reason: SDUPTHER

## 2023-05-08 NOTE — TELEPHONE ENCOUNTER
Called pt and scheduled colonoscopy for 6/29/23 at [facility]. Updated the medical chart and went over the prep instructions. Pt requested that I email them to him at [email or web address]. Pt stated that he has already pickup his prep laxative. juan

## 2023-05-10 DIAGNOSIS — R19.5 POSITIVE COLORECTAL CANCER SCREENING USING COLOGUARD TEST: Primary | ICD-10-CM

## 2023-06-22 VITALS — HEIGHT: 75 IN | WEIGHT: 280 LBS | BODY MASS INDEX: 34.82 KG/M2

## 2023-06-22 DIAGNOSIS — R19.5 POSITIVE COLORECTAL CANCER SCREENING USING COLOGUARD TEST: Primary | ICD-10-CM

## 2023-06-22 NOTE — PROGRESS NOTES
"[facility]  [address] [place] [address]  Phone: [phone]  Fax: [phone]    History & Physical         Provider: Dr. Cathie Lakhani    Patient Name: Carlos Alberto CERNA (age):1945  78 y.o.           Gender: male   Phone: [phone]     Referring Physician: Darrick Manriquez     Vital Signs:   Height - 6' 3"  Weight - 280 lb  BMI -  35.00    Plan: Colonoscopy     Encounter Diagnosis   Name Primary?    Positive colorectal cancer screening using Cologuard test Yes           History:      Past Medical History:   Diagnosis Date    Depression     Enlarged prostate     GERD (gastroesophageal reflux disease)     Headache     High blood pressure     High cholesterol     Mild intermittent asthma, uncomplicated     Obesity, Class II, BMI 35-39.9       Past Surgical History:   Procedure Laterality Date    CATARACT EXTRACTION Bilateral     COLONOSCOPY      RETINAL DETACHMENT SURGERY Right     SKIN CANCER EXCISION      left shoulder, left ear    SPINE SURGERY      Fusion C3-C4, C6-C7, L5-S1    TRANSURETHRAL RESECTION OF PROSTATE      UPPER GASTROINTESTINAL ENDOSCOPY        Medication List with Changes/Refills   Current Medications    ACETAMINOPHEN WITH CODEINE (ACETAMINOPHEN-CODEINE) 120MG 12MG 5ML SOLN    Take by mouth. 1 or 2 60mg acetaminophen/codeine as needed for headache    ALBUTEROL (PROVENTIL) 2.5 MG /3 ML (0.083 %) NEBULIZER SOLUTION    Take 2.5 mg by nebulization every 6 (six) hours as needed for Wheezing. Rescue    ASCORBIC ACID, VITAMIN C, (VITAMIN C) 1000 MG TABLET    Vitamin C 1,000 mg tablet   Take 4 tablets every day by oral route.    ATORVASTATIN (LIPITOR) 10 MG TABLET    Take 10 mg by mouth once daily.    AZELASTINE (ASTELIN) 137 MCG (0.1 %) NASAL SPRAY    1 spray by Nasal route 2 (two) times daily.    BUDESONIDE-FORMOTEROL 160-4.5 MCG (SYMBICORT) 160-4.5 MCG/ACTUATION HFAA    Inhale 2 puffs " into the lungs every 12 (twelve) hours. Controller    CALCIUM CITRATE-VITAMIN D3 315-200 MG (CITRACAL+D) 315 MG-5 MCG (200 UNIT) PER TABLET    Take 1 tablet by mouth 2 (two) times daily.    CELECOXIB (CELEBREX) 400 MG CAPSULE    Take 400 mg by mouth.    CYANOCOBALAMIN, VITAMIN B-12, 5,000 MCG SUBL    Place under the tongue.    FLUOXETINE (PROZAC) 20 MG CAPSULE    Take 20 mg by mouth once daily.    FLUOXETINE 10 MG CAPSULE    TAKE 1 CAPSULE BY MOUTH DAILY with 20 mg CAPSULE    GLUCOSAMINE-CHONDROITIN 500-400 MG TABLET    Take 1 tablet by mouth 3 (three) times daily.    HYDROCHLOROTHIAZIDE (MICROZIDE) 12.5 MG CAPSULE    Take 12.5 mg by mouth once daily.    HYDROCODONE/CHLORPHEN P-STIREX (HYDROCODONE-CHLORPHENIRAMINE ORAL)    Take 5 mL/hr by mouth every 12 (twelve) hours.    HYOSCYAMINE SULFATE/PHENOBARB (PHENOBARBITAL-HYOSCYAMINE ORAL)    Take 1.125 mg by mouth. 1.125 mg Tad Hyoscyamine and 1 15 mg Tab Phenobarbital as needed for stomach pain every 6hrs.    OZEMPIC 0.25 MG OR 0.5 MG (2 MG/3 ML) PEN INJECTOR        PANTOPRAZOLE (PROTONIX) 40 MG TABLET    TAKE 1 TABLET BY MOUTH 2 TIMES A DAY thank youmike -)    PREDNISOLONE ACETATE (PRED FORTE) 1 % DRPS    prednisolone acetate 1 % eye drops,suspension   Instill 1 drop into right eye four times a day    PREDNISONE (DELTASONE) 20 MG TABLET    Take 20 mg by mouth once daily.    SOD SULF-POT CHLORIDE-MAG SULF (SUTAB) 1.479-0.188- 0.225 GRAM TABLET    Take 12 tablets by mouth once daily. Take according to package instructions with indicated amount of water. No breakfast day before test. May substitute with Suprep, Clenpiq, Plenvu, Moviprep or GoLytely based on Rx plan and patient preference.    VITAMIN D3-VITAMIN K2 1000-90 UNIT-MCG TBDL    Take 1 tablet by mouth 2 (two) times a day.      Review of patient's allergies indicates:   Allergen Reactions    Levofloxacin     Acremonium strictum allergenic extract     Aureobasidium pullulans allergenic extract     Cat dander      House dust     Mold     Ragweed pollen     Rhizopus oryzae allergenic extract     Tree and shrub pollen       Family History   Problem Relation Age of Onset    Stomach cancer Neg Hx     Colon cancer Neg Hx     Pancreatic cancer Neg Hx     Liver disease Neg Hx     Throat cancer Neg Hx       Social History     Tobacco Use    Smoking status: Former     Types: Cigarettes     Quit date: 1968     Years since quittin.5    Smokeless tobacco: Never   Substance Use Topics    Alcohol use: Not Currently    Drug use: Never        Physical Examination:     General Appearance:___________________________  HEENT: _____________________________________  Abdomen:____________________________________  Heart:________________________________________  Lungs:_______________________________________  Extremities:___________________________________  Skin:_________________________________________  Endocrine:____________________________________  Genitourinary:_________________________________  Neurological:__________________________________      Patient has been evaluated immediately prior to sedation and is medically cleared for endoscopy with IVCS as an ASA class: ______      Physician Signature: _________________________       Date: ________  Time: ________

## 2023-06-29 ENCOUNTER — OUTSIDE PLACE OF SERVICE (OUTPATIENT)
Dept: GASTROENTEROLOGY | Facility: CLINIC | Age: 78
End: 2023-06-29
Payer: MEDICARE

## 2023-06-29 LAB — CRC RECOMMENDATION EXT: NORMAL

## 2023-06-29 PROCEDURE — 45385 COLONOSCOPY W/LESION REMOVAL: CPT | Mod: KX,,, | Performed by: INTERNAL MEDICINE

## 2023-06-29 PROCEDURE — 45385 PR COLONOSCOPY,REMV LESN,SNARE: ICD-10-PCS | Mod: KX,,, | Performed by: INTERNAL MEDICINE

## 2023-07-06 ENCOUNTER — TELEPHONE (OUTPATIENT)
Dept: GASTROENTEROLOGY | Facility: CLINIC | Age: 78
End: 2023-07-06
Payer: MEDICARE

## 2023-07-06 NOTE — TELEPHONE ENCOUNTER
2 TA, 1 hyp, repeat colonoscopy in 3 years.     Notify patient. Update in Health Maintenance section of Epic.  NBP

## 2023-09-19 NOTE — PROGRESS NOTES
8/28/2023 Tristan EGD: C0M1, removed with forceps. Repeat EGD in 6 months if not dysplasia.  45cm: reflux, 46cm: ND BE, 47cm: ND BE. WATS: ND BE.    Will review with patient at upcoming OV with me. Plan for repeat EGD 2/2024.  NBP

## 2023-10-03 ENCOUNTER — OFFICE VISIT (OUTPATIENT)
Dept: GASTROENTEROLOGY | Facility: CLINIC | Age: 78
End: 2023-10-03
Payer: MEDICARE

## 2023-10-03 VITALS
DIASTOLIC BLOOD PRESSURE: 74 MMHG | HEART RATE: 65 BPM | WEIGHT: 250 LBS | OXYGEN SATURATION: 98 % | HEIGHT: 75 IN | SYSTOLIC BLOOD PRESSURE: 113 MMHG | BODY MASS INDEX: 31.08 KG/M2

## 2023-10-03 DIAGNOSIS — Z86.010 HISTORY OF COLON POLYPS: ICD-10-CM

## 2023-10-03 DIAGNOSIS — K22.70 BARRETT'S ESOPHAGUS WITHOUT DYSPLASIA: Primary | ICD-10-CM

## 2023-10-03 DIAGNOSIS — K21.9 GASTROESOPHAGEAL REFLUX DISEASE, UNSPECIFIED WHETHER ESOPHAGITIS PRESENT: ICD-10-CM

## 2023-10-03 PROCEDURE — 99214 PR OFFICE/OUTPT VISIT, EST, LEVL IV, 30-39 MIN: ICD-10-PCS | Mod: S$GLB,,, | Performed by: INTERNAL MEDICINE

## 2023-10-03 PROCEDURE — 99214 OFFICE O/P EST MOD 30 MIN: CPT | Mod: S$GLB,,, | Performed by: INTERNAL MEDICINE

## 2023-10-03 RX ORDER — PANTOPRAZOLE SODIUM 40 MG/1
40 TABLET, DELAYED RELEASE ORAL DAILY
Qty: 90 TABLET | Refills: 4 | Status: SHIPPED | OUTPATIENT
Start: 2023-10-03

## 2023-10-03 RX ORDER — HYOSCYAMINE SULFATE 0.12 MG/1
TABLET, ORALLY DISINTEGRATING ORAL
COMMUNITY
End: 2023-10-03 | Stop reason: ALTCHOICE

## 2023-10-03 NOTE — PATIENT INSTRUCTIONS
Schedule upper endoscopy in 2/2024.   Continue pantoprazole 40 mg daily.     Please notify my office if you have not been contacted within two weeks after any procedures, submitting any samples (biopsies, blood, stool, urine, etc.) or after any imaging (X-ray, CT, MRI, etc.).

## 2023-10-03 NOTE — LETTER
October 3, 2023        Darrick Manriquez MD  [address] [place] [address] [place] [address]             [facility]  [address] [address]  Phone: [phone]  Fax: [phone]   Patient: Carlos Alberto Modi III   MR Number: [id number]   YOB: 1945   Date of Visit: 10/3/2023       Dear Dr. Manriquez:    Thank you for referring Carlos Alberto Modi to me for evaluation. Attached you will find relevant portions of my assessment and plan of care.    If you have questions, please do not hesitate to call me. I look forward to following Carlos Alberto Modi along with you.    Sincerely,      Cathie Lakhani MD            CC  No Recipients    Enclosure

## 2023-10-03 NOTE — PROGRESS NOTES
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 Diagnoses of Gastroesophageal reflux disease, unspecified whether esophagitis present and History of colon polyps were also pertinent to this visit.    PCP: Darrick Manriquez       HPI:  This is a 78 y.o. male who is here for a follow up. Patient with h/o BE with LGD and had Halo ablation with Dr. Hudson in 6/2023 with repeat EGD in 8/2023 showing ND BE. Plan for repeat EGD in 2/2024. Patient states he is doing well. No dysphagia or painful swallowing. He is taking pantoprazole 40 mg daily and reflux is well controlled. He has lost 30 pounds since 6/2023 that was intentional. Since weight loss, feels his reflux has improved.     8/28/2023 [facility] EGD: C0M1, removed with forceps. Repeat EGD in 6 months if not dysplasia.  45cm: reflux, 46cm: ND BE, 47cm: ND BE. WATS: ND BE. Plan for repeat EGD 2/2024.    6/19/2023 [facility] EGD w/Halo ablation of C0 M2 BE. Rec repeat in 10 weeks.    Colonoscopy 6/29/2023: 2 TA, 1 hyp, repeat colonoscopy in 3 years.     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    Review of Systems   Constitutional:  Negative for diaphoresis, fatigue, fever and unexpected weight change.   HENT:  Negative for hearing loss, mouth sores, postnasal drip, sore throat and trouble swallowing.    Eyes:  Negative for pain, discharge and eye dryness.   Respiratory:  Negative for apnea, cough, choking, chest tightness, shortness of breath and wheezing.    Cardiovascular:  Negative for chest pain, palpitations and leg swelling.   Gastrointestinal:  Positive for reflux. Negative for abdominal distention, abdominal pain, anal bleeding, blood in stool, change in bowel habit, constipation, diarrhea, nausea, rectal pain, vomiting and fecal incontinence.   Genitourinary:  Negative for bladder incontinence, dysuria and  hematuria.   Musculoskeletal:  Negative for arthralgias, back pain and joint swelling.   Integumentary:  Negative for color change and rash.   Allergic/Immunologic: Negative for environmental allergies and food allergies.   Neurological:  Negative for seizures and headaches.   Hematological:  Negative for adenopathy. Bruises/bleeds easily.        Past Medical History:   Diagnosis Date    Miller's esophagus     BMI 31.0-31.9,adult     Depression     Enlarged prostate     GERD (gastroesophageal reflux disease)     Headache     High blood pressure     High cholesterol     Mild intermittent asthma, uncomplicated     Personal history of colonic polyps 2023     Past Surgical History:   Procedure Laterality Date    CATARACT EXTRACTION Bilateral     COLONOSCOPY      RETINAL DETACHMENT SURGERY Right     SKIN CANCER EXCISION      left shoulder, left ear    SPINE SURGERY      Fusion C3-C4, C6-C7, L5-S1    TRANSURETHRAL RESECTION OF PROSTATE      UPPER GASTROINTESTINAL ENDOSCOPY       Family History   Problem Relation Age of Onset    Stomach cancer Neg Hx     Colon cancer Neg Hx     Pancreatic cancer Neg Hx     Liver disease Neg Hx     Throat cancer Neg Hx      Social History     Tobacco Use    Smoking status: Former     Current packs/day: 0.00     Types: Cigarettes     Quit date:      Years since quittin.7    Smokeless tobacco: Never   Substance Use Topics    Alcohol use: Not Currently    Drug use: Never     Review of patient's allergies indicates:   Allergen Reactions    Levofloxacin     Acremonium strictum allergenic extract     Aureobasidium pullulans allergenic extract     Cat dander     House dust     Mold     Ragweed pollen     Rhizopus oryzae allergenic extract     Tree and shrub pollen         Medication List with Changes/Refills   Current Medications    ACETAMINOPHEN WITH CODEINE (ACETAMINOPHEN-CODEINE) 120MG 12MG 5ML SOLN    Take by mouth. 1 or 2 60mg acetaminophen/codeine as needed for headache     ALBUTEROL (PROVENTIL) 2.5 MG /3 ML (0.083 %) NEBULIZER SOLUTION    Take 2.5 mg by nebulization every 6 (six) hours as needed for Wheezing. Rescue    ASCORBIC ACID, VITAMIN C, (VITAMIN C) 1000 MG TABLET    Vitamin C 1,000 mg tablet   Take 4 tablets every day by oral route.    ATORVASTATIN (LIPITOR) 10 MG TABLET    Take 10 mg by mouth once daily.    AZELASTINE (ASTELIN) 137 MCG (0.1 %) NASAL SPRAY    1 spray by Nasal route 2 (two) times daily.    BUDESONIDE-FORMOTEROL 160-4.5 MCG (SYMBICORT) 160-4.5 MCG/ACTUATION HFAA    Inhale 2 puffs into the lungs every 12 (twelve) hours. Controller    CALCIUM CITRATE-VITAMIN D3 315-200 MG (CITRACAL+D) 315 MG-5 MCG (200 UNIT) PER TABLET    Take 1 tablet by mouth 2 (two) times daily.    CELECOXIB (CELEBREX) 400 MG CAPSULE    Take 400 mg by mouth.    CYANOCOBALAMIN, VITAMIN B-12, 5,000 MCG SUBL    Place under the tongue.    FLUOXETINE (PROZAC) 20 MG CAPSULE    Take 20 mg by mouth once daily.    FLUOXETINE 10 MG CAPSULE    TAKE 1 CAPSULE BY MOUTH DAILY with 20 mg CAPSULE    GLUCOSAMINE-CHONDROITIN 500-400 MG TABLET    Take 1 tablet by mouth 3 (three) times daily.    HYDROCHLOROTHIAZIDE (MICROZIDE) 12.5 MG CAPSULE    Take 12.5 mg by mouth once daily.    HYDROCODONE/CHLORPHEN P-STIREX (HYDROCODONE-CHLORPHENIRAMINE ORAL)    Take 5 mL/hr by mouth every 12 (twelve) hours.    HYOSCYAMINE SULFATE/PHENOBARB (PHENOBARBITAL-HYOSCYAMINE ORAL)    Take 1.125 mg by mouth. 1.125 mg Tad Hyoscyamine and 1 15 mg Tab Phenobarbital as needed for stomach pain every 6hrs.    PREDNISOLONE ACETATE (PRED FORTE) 1 % DRPS    prednisolone acetate 1 % eye drops,suspension   Instill 1 drop into right eye four times a day    SOD SULF-POT CHLORIDE-MAG SULF (SUTAB) 1.479-0.188- 0.225 GRAM TABLET    Take 12 tablets by mouth once daily. Take according to package instructions with indicated amount of water. No breakfast day before test. May substitute with Suprep, Clenpiq, Plenvu, Moviprep or GoLytely based on Rx plan  "and patient preference.    VITAMIN D3-VITAMIN K2 1000-90 UNIT-MCG TBDL    Take 1 tablet by mouth 2 (two) times a day.   Changed and/or Refilled Medications    Modified Medication Previous Medication    PANTOPRAZOLE (PROTONIX) 40 MG TABLET pantoprazole (PROTONIX) 40 MG tablet       Take 1 tablet (40 mg total) by mouth once daily.    TAKE 1 TABLET BY MOUTH 2 TIMES A DAY page hawk -)   Discontinued Medications    HYOSCYAMINE (LEVSIN) 0.125 MG TBDL    hyoscyamine 0.125 mg disintegrating tablet   DISSOLVE 1 or 2 TABLETS BY MOUTH 4 TIMES A DAY AS NEEDED    OZEMPIC 0.25 MG OR 0.5 MG (2 MG/3 ML) PEN INJECTOR        PANTOPRAZOLE (PROTONIX) 40 MG TABLET    Take 1 tablet (40 mg total) by mouth 2 (two) times daily.    PREDNISONE (DELTASONE) 20 MG TABLET    Take 20 mg by mouth once daily.         Vital Signs:  /74   Pulse 65   Ht 6' 3" (1.905 m)   Wt 113.4 kg (250 lb)   SpO2 98%   BMI 31.25 kg/m²         Physical Exam  Constitutional:       General: He is not in acute distress.     Appearance: Normal appearance. He is well-developed. He is not ill-appearing or toxic-appearing.   HENT:      Head: Normocephalic and atraumatic.      Nose: Nose normal.      Mouth/Throat:      Mouth: Mucous membranes are moist.      Pharynx: Oropharynx is clear. No oropharyngeal exudate or posterior oropharyngeal erythema.   Eyes:      General: Lids are normal. No scleral icterus.        Right eye: No discharge.         Left eye: No discharge.      Conjunctiva/sclera: Conjunctivae normal.   Cardiovascular:      Rate and Rhythm: Normal rate and regular rhythm.      Pulses:           Radial pulses are 2+ on the right side and 2+ on the left side.   Pulmonary:      Effort: Pulmonary effort is normal. No respiratory distress.      Breath sounds: No stridor. No wheezing.   Abdominal:      General: Bowel sounds are normal. There is no distension.      Palpations: Abdomen is soft. There is no fluid wave, hepatomegaly, splenomegaly or mass.    "   Tenderness: There is no abdominal tenderness. There is no guarding or rebound.   Musculoskeletal:      Cervical back: Full passive range of motion without pain.   Lymphadenopathy:      Cervical: No cervical adenopathy.   Skin:     General: Skin is warm and dry.      Capillary Refill: Capillary refill takes less than 2 seconds.      Coloration: Skin is not cyanotic, jaundiced or pale.   Neurological:      General: No focal deficit present.      Mental Status: He is alert and oriented to person, place, and time.   Psychiatric:         Mood and Affect: Mood normal.         Behavior: Behavior is cooperative.            All of the data above and below has been reviewed by myself and any further interpretations will be reflected in the assessment and plan.   The data includes review of external notes, and independent interpretation of lab results, procedures, x-rays, and imaging reports.      Assessment:  Miller's esophagus without dysplasia  -     pantoprazole (PROTONIX) 40 MG tablet; Take 1 tablet (40 mg total) by mouth once daily.  Dispense: 90 tablet; Refill: 4  -     Ambulatory Referral to External Surgery    Gastroesophageal reflux disease, unspecified whether esophagitis present    History of colon polyps    Colonoscopy due 2026  Had BE w/LGD s/p Halo ablation 6/2023. Repeat EGD 8/2023 with ND BE. Plan for repeat EGD in 2/2024 for surveillance.     Recommendations:  Schedule upper endoscopy in 2/2024.   Continue pantoprazole 40 mg daily.     Risks, benefits, and alternatives of medical management, any associated procedures, and/or treatment discussed with the patient. Patient given opportunity to ask questions and voices understanding. Patient has elected to proceed with the recommended care modalities as discussed.    Instructed patient to notify my office if they have not been contacted within two weeks after any procedures, submitting any samples (biopsies, blood, stool, urine, etc.) or after any imaging  (X-ray, CT, MRI, etc.).     Follow up in about 1 year (around 10/3/2024).    Order summary:  Orders Placed This Encounter   Procedures    Ambulatory Referral to External Surgery      This assessment, plan, and documentation was performed in collaboration with Ofelia Mathew NP.     This document may have been created using a voice recognition transcribing system. Incorrect words or phrases may have been missed during proofreading. Please interpret accordingly or contact me for clarification.     Cathie Lakhani MD

## 2024-01-30 ENCOUNTER — TELEPHONE (OUTPATIENT)
Dept: GASTROENTEROLOGY | Facility: CLINIC | Age: 79
End: 2024-01-30
Payer: MEDICARE

## 2024-01-30 VITALS — BODY MASS INDEX: 31.08 KG/M2 | WEIGHT: 250 LBS | HEIGHT: 75 IN

## 2024-01-30 DIAGNOSIS — K21.9 GASTROESOPHAGEAL REFLUX DISEASE, UNSPECIFIED WHETHER ESOPHAGITIS PRESENT: ICD-10-CM

## 2024-01-30 DIAGNOSIS — K22.70 BARRETT'S ESOPHAGUS WITHOUT DYSPLASIA: Primary | ICD-10-CM

## 2024-01-30 NOTE — TELEPHONE ENCOUNTER
S/w pt and told him that I was calling as a courtesy regarding upcoming EGD with NBP on 2/7/24, Wed and wanted to verify that he had his paper prep instructions. Pt stated he did. I also mentioned that BALTAZAR will call on Tues with the arrival time, the GI Lab is located on the third floor, and to pre-register before Wed. juan

## 2024-01-30 NOTE — TELEPHONE ENCOUNTER
"[facility]  [address] [place] [address]  Phone: [phone]  Fax: [phone]    History & Physical         Provider: Dr. Cathie Lakhani    Patient Name: Carlos Alberto CERNA (age):1945  79 y.o.           Gender: male   Phone: [phone]     Referring Physician: Darrick Manriquez     Vital Signs:   Height - 6' 3"  Weight -  250 lb  BMI -  31.25    Plan: EGD @ [facility]    Encounter Diagnoses   Name Primary?    Miller's esophagus without dysplasia Yes    Gastroesophageal reflux disease, unspecified whether esophagitis present            History:      Past Medical History:   Diagnosis Date    Miller's esophagus     BMI 31.0-31.9,adult     Depression     Enlarged prostate     GERD (gastroesophageal reflux disease)     Headache     High blood pressure     High cholesterol     Mild intermittent asthma, uncomplicated     Personal history of colonic polyps 2023      Past Surgical History:   Procedure Laterality Date    CATARACT EXTRACTION Bilateral     COLONOSCOPY      RETINAL DETACHMENT SURGERY Right     SKIN CANCER EXCISION      left shoulder, left ear    SPINE SURGERY      Fusion C3-C4, C6-C7, L5-S1    TRANSURETHRAL RESECTION OF PROSTATE      UPPER GASTROINTESTINAL ENDOSCOPY        Medication List with Changes/Refills   Current Medications    ACETAMINOPHEN WITH CODEINE (ACETAMINOPHEN-CODEINE) 120MG 12MG 5ML SOLN    Take by mouth. 1 or 2 60mg acetaminophen/codeine as needed for headache    ALBUTEROL (PROVENTIL) 2.5 MG /3 ML (0.083 %) NEBULIZER SOLUTION    Take 2.5 mg by nebulization every 6 (six) hours as needed for Wheezing. Rescue    ASCORBIC ACID, VITAMIN C, (VITAMIN C) 1000 MG TABLET    Vitamin C 1,000 mg tablet   Take 4 tablets every day by oral route.    ATORVASTATIN (LIPITOR) 10 MG TABLET    Take 10 mg by mouth once daily.    AZELASTINE (ASTELIN) 137 MCG (0.1 %) NASAL SPRAY    1 spray by " Nasal route 2 (two) times daily.    BUDESONIDE-FORMOTEROL 160-4.5 MCG (SYMBICORT) 160-4.5 MCG/ACTUATION HFAA    Inhale 2 puffs into the lungs every 12 (twelve) hours. Controller    CALCIUM CITRATE-VITAMIN D3 315-200 MG (CITRACAL+D) 315 MG-5 MCG (200 UNIT) PER TABLET    Take 1 tablet by mouth 2 (two) times daily.    CELECOXIB (CELEBREX) 400 MG CAPSULE    Take 400 mg by mouth.    CYANOCOBALAMIN, VITAMIN B-12, 5,000 MCG SUBL    Place under the tongue.    FLUOXETINE (PROZAC) 20 MG CAPSULE    Take 20 mg by mouth once daily.    FLUOXETINE 10 MG CAPSULE    TAKE 1 CAPSULE BY MOUTH DAILY with 20 mg CAPSULE    GLUCOSAMINE-CHONDROITIN 500-400 MG TABLET    Take 1 tablet by mouth 3 (three) times daily.    HYDROCHLOROTHIAZIDE (MICROZIDE) 12.5 MG CAPSULE    Take 12.5 mg by mouth once daily.    HYDROCODONE/CHLORPHEN P-STIREX (HYDROCODONE-CHLORPHENIRAMINE ORAL)    Take 5 mL/hr by mouth every 12 (twelve) hours.    HYOSCYAMINE SULFATE/PHENOBARB (PHENOBARBITAL-HYOSCYAMINE ORAL)    Take 1.125 mg by mouth. 1.125 mg Tad Hyoscyamine and 1 15 mg Tab Phenobarbital as needed for stomach pain every 6hrs.    PANTOPRAZOLE (PROTONIX) 40 MG TABLET    Take 1 tablet (40 mg total) by mouth once daily.    PREDNISOLONE ACETATE (PRED FORTE) 1 % DRPS    prednisolone acetate 1 % eye drops,suspension   Instill 1 drop into right eye four times a day    SOD SULF-POT CHLORIDE-MAG SULF (SUTAB) 1.479-0.188- 0.225 GRAM TABLET    Take 12 tablets by mouth once daily. Take according to package instructions with indicated amount of water. No breakfast day before test. May substitute with Suprep, Clenpiq, Plenvu, Moviprep or GoLytely based on Rx plan and patient preference.    VITAMIN D3-VITAMIN K2 1000-90 UNIT-MCG TBDL    Take 1 tablet by mouth 2 (two) times a day.      Review of patient's allergies indicates:   Allergen Reactions    Levofloxacin     Acremonium strictum allergenic extract     Aureobasidium pullulans allergenic extract     Cat dander     House dust      Mold     Ragweed pollen     Rhizopus oryzae allergenic extract     Tree and shrub pollen       Family History   Problem Relation Age of Onset    Stomach cancer Neg Hx     Colon cancer Neg Hx     Pancreatic cancer Neg Hx     Liver disease Neg Hx     Throat cancer Neg Hx       Social History     Tobacco Use    Smoking status: Former     Current packs/day: 0.00     Types: Cigarettes     Quit date: 1968     Years since quittin.1    Smokeless tobacco: Never   Substance Use Topics    Alcohol use: Not Currently    Drug use: Never        Physical Examination:     General Appearance:___________________________  HEENT: _____________________________________  Abdomen:____________________________________  Heart:________________________________________  Lungs:_______________________________________  Extremities:___________________________________  Skin:_________________________________________  Endocrine:____________________________________  Genitourinary:_________________________________  Neurological:__________________________________      Patient has been evaluated immediately prior to sedation and is medically cleared for endoscopy with IVCS as an ASA class: ______      Physician Signature: _________________________       Date: ________  Time: ________

## 2024-02-07 ENCOUNTER — OUTSIDE PLACE OF SERVICE (OUTPATIENT)
Dept: GASTROENTEROLOGY | Facility: CLINIC | Age: 79
End: 2024-02-07
Payer: MEDICARE

## 2024-02-07 PROCEDURE — 43239 EGD BIOPSY SINGLE/MULTIPLE: CPT | Mod: ,,, | Performed by: INTERNAL MEDICINE

## 2024-02-16 ENCOUNTER — TELEPHONE (OUTPATIENT)
Dept: GASTROENTEROLOGY | Facility: CLINIC | Age: 79
End: 2024-02-16

## 2024-02-16 NOTE — TELEPHONE ENCOUNTER
distEBx reflux.    Notify patient that his EBx were benign.  No active BE seen on the Bx as well. Repeat EGD in one year. Keep follow up OV with me an notify me sooner if any issues.  [facility]

## 2024-05-01 ENCOUNTER — TELEPHONE (OUTPATIENT)
Dept: GASTROENTEROLOGY | Facility: CLINIC | Age: 79
End: 2024-05-01
Payer: MEDICARE

## 2024-05-01 NOTE — TELEPHONE ENCOUNTER
----- Message from Juliann Paul sent at 5/1/2024  2:57 PM CDT -----  Contact: self  Type: Staff Message    Caller: CarlosA lberto Modi III  Call Back Number: [phone]  Nature of the Call: pantoprazole (PROTONIX) 40 MG tablet 90 tablet 4 10/3/2023 Sig: Take 1 tablet (40 mg total) by mouth once daily. Route: Oral pt states medication is not working . Please advise  Additional Information: na

## 2024-05-01 NOTE — TELEPHONE ENCOUNTER
Spoke to pt. Dr. Hudson has him taking sucralfate and panto 40 bid. He has been having a lot of heartburn at night lately. He is not sure if it is due to the emotional strain he's been dealing with over the last year. He has Maalox w/ lido left over and wants to know if you think this might help his sx? Or if something needs to changed or checked? -kg

## 2024-05-17 NOTE — TELEPHONE ENCOUNTER
He may try the Maalox w/ lidocaine. If he is still having symptoms we can add another medication to take at nighttime. He may also have OV w/ NP if still with symptoms.  KN

## 2024-05-27 ENCOUNTER — PATIENT MESSAGE (OUTPATIENT)
Dept: GASTROENTEROLOGY | Facility: CLINIC | Age: 79
End: 2024-05-27
Payer: MEDICARE

## 2024-08-29 ENCOUNTER — TELEPHONE (OUTPATIENT)
Dept: GASTROENTEROLOGY | Facility: CLINIC | Age: 79
End: 2024-08-29
Payer: MEDICARE

## 2024-08-29 DIAGNOSIS — K22.70 BARRETT'S ESOPHAGUS WITHOUT DYSPLASIA: ICD-10-CM

## 2024-08-29 NOTE — TELEPHONE ENCOUNTER
----- Message from Nela Thompson sent at 8/29/2024  8:56 AM CDT -----  Contact: self  Patient is requesting a call back regarding asking can another medication be prescribed for GERD. Please call back at [phone]   New script sent to [facility] pharmacy for ofloxacin otic drops 6 drops right ear twice daily for 7 days.

## 2024-08-29 NOTE — TELEPHONE ENCOUNTER
Called and spoke with patient he stated that he has been having bad Gerd and have been taking TUMS and its not working for him he anted to see if something else can be called out for him , he uses [facility] in [place].  [facility]

## 2024-08-29 NOTE — TELEPHONE ENCOUNTER
Called and spoke with patient he stated that he is not taking pantoprazole he called  office a couple of weeks ago and ask for a refill and they never sent it in so that's why he has been taking just tums.  [facility]

## 2024-08-30 RX ORDER — PANTOPRAZOLE SODIUM 40 MG/1
40 TABLET, DELAYED RELEASE ORAL 2 TIMES DAILY
Qty: 180 TABLET | Refills: 3 | Status: SHIPPED | OUTPATIENT
Start: 2024-08-30 | End: 2025-08-30

## 2024-08-30 NOTE — TELEPHONE ENCOUNTER
Panto 40mg BID sent to [facility]. He should keep his OV with Dr. Lakhani and notify if symptoms worsen.  KN

## 2024-09-04 ENCOUNTER — PATIENT MESSAGE (OUTPATIENT)
Dept: GASTROENTEROLOGY | Facility: CLINIC | Age: 79
End: 2024-09-04
Payer: MEDICARE

## 2024-09-04 DIAGNOSIS — K22.70 BARRETT'S ESOPHAGUS WITHOUT DYSPLASIA: ICD-10-CM

## 2024-09-04 DIAGNOSIS — K21.9 GASTROESOPHAGEAL REFLUX DISEASE, UNSPECIFIED WHETHER ESOPHAGITIS PRESENT: Primary | ICD-10-CM

## 2024-09-05 RX ORDER — FAMOTIDINE 40 MG/1
40 TABLET, FILM COATED ORAL 2 TIMES DAILY
Qty: 180 TABLET | Refills: 1 | Status: SHIPPED | OUTPATIENT
Start: 2024-09-05

## 2024-09-05 NOTE — TELEPHONE ENCOUNTER
He should not take pantoprazole more than 2 times a day (no more than 80mg daily). I can send out famotidine 40mg BID to take as well. He can start with taking once daily at night time but if he needs to, can take in the AM as well. He should continue his pantoprazole twice daily. If this does not help, consider Voquezna.  Rx sent.  KN

## 2024-09-11 ENCOUNTER — TELEPHONE (OUTPATIENT)
Dept: GASTROENTEROLOGY | Facility: CLINIC | Age: 79
End: 2024-09-11
Payer: MEDICARE

## 2024-09-11 NOTE — TELEPHONE ENCOUNTER
Staff spoke to pt.. he stated no one ever answered his portal message.. I advised him a reply was sent back to him 9-5-24 he just didn't read it... he stated he couldn't find the message.. pt was advised per NICHOLAS...   He should not take pantoprazole more than 2 times a day (no more than 80mg daily). I can send out famotidine 40mg BID to take as well. He can start with taking once daily at night time but if he needs to, can take in the AM as well. He should continue his pantoprazole twice daily. If this does not help, consider Voquezna....   gatito

## 2024-09-11 NOTE — TELEPHONE ENCOUNTER
----- Message from Domi Ruiz sent at 9/11/2024 11:32 AM CDT -----  Regarding: medication question  Contact: pt  Pt calling w/question about script famotidine (PEPCID) 40 MG and want to know if he takes w/pantoprazole (PROTONIX) 40 MG and he can be reached at [phone].  Does pt takes both medication together     Thanks,

## 2024-10-03 ENCOUNTER — TELEPHONE (OUTPATIENT)
Dept: GASTROENTEROLOGY | Facility: CLINIC | Age: 79
End: 2024-10-03

## 2024-10-03 ENCOUNTER — OFFICE VISIT (OUTPATIENT)
Dept: GASTROENTEROLOGY | Facility: CLINIC | Age: 79
End: 2024-10-03
Payer: MEDICARE

## 2024-10-03 VITALS
HEIGHT: 72 IN | BODY MASS INDEX: 33.81 KG/M2 | HEART RATE: 64 BPM | DIASTOLIC BLOOD PRESSURE: 85 MMHG | SYSTOLIC BLOOD PRESSURE: 122 MMHG | WEIGHT: 249.63 LBS | OXYGEN SATURATION: 96 % | RESPIRATION RATE: 16 BRPM

## 2024-10-03 DIAGNOSIS — K21.9 GASTROESOPHAGEAL REFLUX DISEASE, UNSPECIFIED WHETHER ESOPHAGITIS PRESENT: ICD-10-CM

## 2024-10-03 DIAGNOSIS — Z86.0100 HISTORY OF COLON POLYPS: ICD-10-CM

## 2024-10-03 DIAGNOSIS — K22.710 BARRETT'S ESOPHAGUS WITH LOW GRADE DYSPLASIA: Primary | ICD-10-CM

## 2024-10-03 PROCEDURE — 99214 OFFICE O/P EST MOD 30 MIN: CPT | Mod: S$GLB,,, | Performed by: INTERNAL MEDICINE

## 2024-10-03 RX ORDER — FLUTICASONE FUROATE AND VILANTEROL 100; 25 UG/1; UG/1
1 POWDER RESPIRATORY (INHALATION) DAILY
COMMUNITY

## 2024-10-03 RX ORDER — PHENOBARBITAL 32.4 MG/1
TABLET ORAL
COMMUNITY

## 2024-10-03 NOTE — TELEPHONE ENCOUNTER
Patient was given instructions and they were reviewed with patient. Patient was given AVS with apt details. Order for egd was faxed over to [facility]. No pa required.10/3/24 lra

## 2024-10-03 NOTE — LETTER
October 3, 2024        Jeovany Manriquez MD  [address] [facility] [address]             [facility]  [address] [place] [address]  Phone: [phone]  Fax: [phone]   Patient: Carlos Alberto Modi III   MR Number: [id number]   YOB: 1945   Date of Visit: 10/3/2024       Dear Dr. Manriquez:    Thank you for referring Carlos Alberto Modi to me for evaluation. Attached you will find relevant portions of my assessment and plan of care.    If you have questions, please do not hesitate to call me. I look forward to following Carlos Alberto Modi along with you.    Sincerely,      Cathie Lakhani MD            CC  No Recipients    Enclosure

## 2024-10-03 NOTE — PATIENT INSTRUCTIONS
Schedule upper endoscopy.  Continue pantoprazole 40 mg twice a day.  Continue famotidine 40 mg before bedtime.   Elevate the head of your bed 4-6 inches.   Avoid lying down at least 4 hours after eating.     Please notify my office if you have not been contacted within two weeks after any procedures, submitting any samples (biopsies, blood, stool, urine, etc.) or after any imaging (X-ray, CT, MRI, etc.).

## 2024-10-03 NOTE — PROGRESS NOTES
Clinic Note    Reason for visit:  The primary encounter diagnosis was Miller's esophagus with low grade dysplasia. Diagnoses of Gastroesophageal reflux disease, unspecified whether esophagitis present and History of colon polyps were also pertinent to this visit.    PCP: Jeovany Manriquez       HPI:  This is a 79 y.o. male here for follow up. Patient with h/o BE with LGD and had Halo ablation with Dr. Hudson in 6/2023. He has reflux and has been on pantoprazole 40 mg BID. He recently started having worsening reflux and has added famotidine 40 mg BID as well. If he wakes up during the night, he will notice burning and takes Tums. No issue during the day. Sleeps on a wedge pillow. No trouble swallowing.       EGD 2/7/2024 distEBx reflux.- repeat EGD in 1 year  [facility] EGD 8/28/2023 : C0M1, removed with forceps. Repeat EGD in 6 months if not dysplasia.45cm: reflux, 46cm: ND BE, 47cm: ND BE. WATS: ND BE. Plan for repeat EGD 2/2024.  [facility] EGD 6/19/2023 w/Halo ablation of C0 M2 BE. Rec repeat in 10 weeks.     Colonoscopy 6/29/2023: 2 TA, 1 hyp, repeat colonoscopy in 3 years.      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    Review of Systems   Constitutional:  Negative for diaphoresis, fatigue, fever and unexpected weight change.   HENT:  Negative for hearing loss, mouth sores, postnasal drip, sore throat and trouble swallowing.    Eyes:  Negative for pain, discharge and eye dryness.   Respiratory:  Positive for cough. Negative for apnea, choking, chest tightness, shortness of breath and wheezing.    Cardiovascular:  Negative for chest pain, palpitations and leg swelling.   Gastrointestinal:  Positive for reflux. Negative for abdominal distention, abdominal pain, anal bleeding, blood in stool, change in bowel habit, constipation, diarrhea, nausea, rectal pain, vomiting and fecal incontinence.   Genitourinary:  Negative for  bladder incontinence, dysuria and hematuria.   Musculoskeletal:  Negative for arthralgias, back pain and joint swelling.   Integumentary:  Negative for color change and rash.   Allergic/Immunologic: Negative for environmental allergies and food allergies.   Neurological:  Negative for seizures and headaches.   Hematological:  Negative for adenopathy. Does not bruise/bleed easily.        Past Medical History:   Diagnosis Date    Miller's esophagus     BMI 31.0-31.9,adult     Depression     Enlarged prostate     GERD (gastroesophageal reflux disease)     Headache     High blood pressure     High cholesterol     Mild intermittent asthma, uncomplicated     Personal history of colonic polyps 2023     Past Surgical History:   Procedure Laterality Date    CATARACT EXTRACTION Bilateral     COLONOSCOPY      RETINAL DETACHMENT SURGERY Right     SKIN CANCER EXCISION      left shoulder, left ear    SPINE SURGERY      Fusion C3-C4, C6-C7, L5-S1    TRANSURETHRAL RESECTION OF PROSTATE      UPPER GASTROINTESTINAL ENDOSCOPY       Family History   Problem Relation Name Age of Onset    Stomach cancer Neg Hx      Colon cancer Neg Hx      Pancreatic cancer Neg Hx      Liver disease Neg Hx      Throat cancer Neg Hx       Social History     Tobacco Use    Smoking status: Former     Current packs/day: 0.00     Types: Cigarettes     Quit date:      Years since quittin.7    Smokeless tobacco: Never   Substance Use Topics    Alcohol use: Not Currently    Drug use: Never     Review of patient's allergies indicates:   Allergen Reactions    Levofloxacin     Acremonium strictum allergenic extract     Aureobasidium pullulans allergenic extract     Cat dander     House dust     Mold     Ragweed pollen     Rhizopus oryzae allergenic extract     Tree and shrub pollen         Medication List with Changes/Refills   Current Medications    ACETAMINOPHEN WITH CODEINE (ACETAMINOPHEN-CODEINE) 120MG 12MG 5ML SOLN    Take by mouth. 1 or 2 60mg  acetaminophen/codeine as needed for headache    ALBUTEROL (PROVENTIL) 2.5 MG /3 ML (0.083 %) NEBULIZER SOLUTION    Take 2.5 mg by nebulization every 6 (six) hours as needed for Wheezing. Rescue    ASCORBIC ACID, VITAMIN C, (VITAMIN C) 1000 MG TABLET    Vitamin C 1,000 mg tablet   Take 4 tablets every day by oral route.    ATORVASTATIN (LIPITOR) 10 MG TABLET    Take 10 mg by mouth once daily.    CALCIUM CITRATE-VITAMIN D3 315-200 MG (CITRACAL+D) 315 MG-5 MCG (200 UNIT) PER TABLET    Take 1 tablet by mouth 2 (two) times daily.    CELECOXIB (CELEBREX) 400 MG CAPSULE    Take 400 mg by mouth.    CYANOCOBALAMIN, VITAMIN B-12, 5,000 MCG SUBL    Place under the tongue.    FAMOTIDINE (PEPCID) 40 MG TABLET    Take 1 tablet (40 mg total) by mouth 2 (two) times daily.    FLUOXETINE (PROZAC) 20 MG CAPSULE    Take 20 mg by mouth once daily.    FLUOXETINE 10 MG CAPSULE    TAKE 1 CAPSULE BY MOUTH DAILY with 20 mg CAPSULE    FLUTICASONE FUROATE-VILANTEROL (BREO) 100-25 MCG/DOSE DISKUS INHALER    Inhale 1 puff into the lungs once daily.    GLUCOSAMINE-CHONDROITIN 500-400 MG TABLET    Take 1 tablet by mouth 3 (three) times daily.    HYDROCHLOROTHIAZIDE (MICROZIDE) 12.5 MG CAPSULE    Take 12.5 mg by mouth once daily.    HYDROCODONE/CHLORPHEN P-STIREX (HYDROCODONE-CHLORPHENIRAMINE ORAL)    Take 5 mL/hr by mouth every 12 (twelve) hours.    HYOSCYAMINE SULFATE/PHENOBARB (PHENOBARBITAL-HYOSCYAMINE ORAL)    Take 1.125 mg by mouth. 1.125 mg Tad Hyoscyamine and 1 15 mg Tab Phenobarbital as needed for stomach pain every 6hrs.    PANTOPRAZOLE (PROTONIX) 40 MG TABLET    Take 1 tablet (40 mg total) by mouth 2 (two) times daily.    PHENOBARBITAL 32.4 MG TABLET    TAKE 1/2 TABLET BY MOUTH 2 TIMES A DAY AS NEEDED    VITAMIN D3-VITAMIN K2 1000-90 UNIT-MCG TBDL    Take 1 tablet by mouth 2 (two) times a day.   Discontinued Medications    AZELASTINE (ASTELIN) 137 MCG (0.1 %) NASAL SPRAY    1 spray by Nasal route 2 (two) times daily.     BUDESONIDE-FORMOTEROL 160-4.5 MCG (SYMBICORT) 160-4.5 MCG/ACTUATION HFAA    Inhale 2 puffs into the lungs every 12 (twelve) hours. Controller    PREDNISOLONE ACETATE (PRED FORTE) 1 % DRPS    prednisolone acetate 1 % eye drops,suspension   Instill 1 drop into right eye four times a day    SOD SULF-POT CHLORIDE-MAG SULF (SUTAB) 1.479-0.188- 0.225 GRAM TABLET    Take 12 tablets by mouth once daily. Take according to package instructions with indicated amount of water. No breakfast day before test. May substitute with Suprep, Clenpiq, Plenvu, Moviprep or GoLytely based on Rx plan and patient preference.         Vital Signs:  /85 (BP Location: Left arm, Patient Position: Sitting)   Pulse 64   Resp 16   Ht 6' (1.829 m)   Wt 113.2 kg (249 lb 9.6 oz)   SpO2 96%   BMI 33.85 kg/m²         Physical Exam  Constitutional:       General: He is not in acute distress.     Appearance: Normal appearance. He is well-developed. He is not ill-appearing or toxic-appearing.   HENT:      Head: Normocephalic and atraumatic.      Nose: Nose normal.      Mouth/Throat:      Mouth: Mucous membranes are moist.      Pharynx: Oropharynx is clear. No oropharyngeal exudate or posterior oropharyngeal erythema.   Eyes:      General: Lids are normal. No scleral icterus.        Right eye: No discharge.         Left eye: No discharge.      Conjunctiva/sclera: Conjunctivae normal.   Cardiovascular:      Rate and Rhythm: Normal rate and regular rhythm.      Pulses:           Radial pulses are 2+ on the right side and 2+ on the left side.   Pulmonary:      Effort: Pulmonary effort is normal. No respiratory distress.      Breath sounds: No stridor. No wheezing.   Abdominal:      General: Bowel sounds are normal. There is no distension.      Palpations: Abdomen is soft. There is no fluid wave, hepatomegaly, splenomegaly or mass.      Tenderness: There is no abdominal tenderness. There is no guarding or rebound.   Lymphadenopathy:      Cervical: No  cervical adenopathy.   Skin:     General: Skin is warm and dry.      Capillary Refill: Capillary refill takes less than 2 seconds.      Coloration: Skin is not cyanotic, jaundiced or pale.   Neurological:      General: No focal deficit present.      Mental Status: He is alert and oriented to person, place, and time.   Psychiatric:         Mood and Affect: Mood normal.         Behavior: Behavior is cooperative.            All of the data above and below has been reviewed by myself and any further interpretations will be reflected in the assessment and plan.   The data includes review of external notes, and independent interpretation of lab results, procedures, x-rays, and imaging reports.      Assessment:  Miller's esophagus with low grade dysplasia  -     Ambulatory Referral to External Surgery    Gastroesophageal reflux disease, unspecified whether esophagitis present  -     Ambulatory Referral to External Surgery    History of colon polyps      GERD, on panto 40 BID. BT symptoms at night. Add famotidine 40 at bedtime and reflux precautions given.  [facility]ret[facility] with LGD s/p ablation in 6/2023- EGD for BE surveillance due 2/2025  Hx colon polyps- colonoscopy due 2026    Recommendations:    Schedule upper endoscopy.  Continue pantoprazole 40 mg twice a day.  Continue famotidine 40 mg before bedtime.   Elevate the head of your bed 4-6 inches.   Avoid lying down at least 4 hours after eating.     Risks, benefits, and alternatives of medical management, any associated procedures, and/or treatment discussed with the patient. Patient given opportunity to ask questions and voices understanding. Patient has elected to proceed with the recommended care modalities as discussed.    Instructed patient to notify my office if they have not been contacted within two weeks after any procedures, submitting any samples (biopsies, blood, stool, urine, etc.) or after any imaging (X-ray, CT, MRI, etc.).     Follow up in about 1 year  (around 10/3/2025). With NP    Order summary:  Orders Placed This Encounter   Procedures    Ambulatory Referral to External Surgery      This assessment, plan, and documentation was performed in collaboration with Ofelia Mathew NP.     This document may have been created using a voice recognition transcribing system. Incorrect words or phrases may have been missed during proofreading. Please interpret accordingly or contact me for clarification.     Cathie Lakhani MD

## 2025-02-06 ENCOUNTER — TELEPHONE (OUTPATIENT)
Dept: GASTROENTEROLOGY | Facility: CLINIC | Age: 80
End: 2025-02-06
Payer: MEDICARE

## 2025-02-06 VITALS — HEIGHT: 72 IN | BODY MASS INDEX: 33.72 KG/M2 | WEIGHT: 249 LBS

## 2025-02-06 DIAGNOSIS — K22.710 BARRETT'S ESOPHAGUS WITH LOW GRADE DYSPLASIA: Primary | ICD-10-CM

## 2025-02-06 DIAGNOSIS — K21.9 GASTROESOPHAGEAL REFLUX DISEASE, UNSPECIFIED WHETHER ESOPHAGITIS PRESENT: ICD-10-CM

## 2025-02-06 NOTE — TELEPHONE ENCOUNTER
S/w pt and that I was calling as a courtesy regarding up coming EGD with [facility] on 2/17/25, Monday and wanted to verify that he has his paper prep instructions. I also mentioned that [facility] will call the day before (Fri) with the arrival time, GI Lab is located on the third floor, and to pre-register any time next week. juan

## 2025-02-06 NOTE — TELEPHONE ENCOUNTER
"[place] [facility]  [address] [place] [address]  Phone: [phone]  Fax: [phone]    History & Physical         Provider: Dr. Cathie Lakhani    Patient Name: Carlos Alberto CERNA (age):1945  80 y.o.           Gender: male   Phone: [phone]     Referring Physician: Jeoavny Manriquez     Vital Signs:   Height - 6' 0"  Weight - 249 lb  BMI -  33.77    Plan: EGD w/Ebx @ [facility]    Encounter Diagnoses   Name Primary?    Miller's esophagus with low grade dysplasia Yes    Gastroesophageal reflux disease, unspecified whether esophagitis present            History:      Past Medical History:   Diagnosis Date    Miller's esophagus     BMI 31.0-31.9,adult     Depression     Enlarged prostate     GERD (gastroesophageal reflux disease)     Headache     High blood pressure     High cholesterol     Mild intermittent asthma, uncomplicated     Personal history of colonic polyps 2023      Past Surgical History:   Procedure Laterality Date    CATARACT EXTRACTION Bilateral     COLONOSCOPY      RETINAL DETACHMENT SURGERY Right     SKIN CANCER EXCISION      left shoulder, left ear    SPINE SURGERY      Fusion C3-C4, C6-C7, L5-S1    TRANSURETHRAL RESECTION OF PROSTATE      UPPER GASTROINTESTINAL ENDOSCOPY        Medication List with Changes/Refills   Current Medications    ACETAMINOPHEN WITH CODEINE (ACETAMINOPHEN-CODEINE) 120MG 12MG 5ML SOLN    Take by mouth. 1 or 2 60mg acetaminophen/codeine as needed for headache    ALBUTEROL (PROVENTIL) 2.5 MG /3 ML (0.083 %) NEBULIZER SOLUTION    Take 2.5 mg by nebulization every 6 (six) hours as needed for Wheezing. Rescue    ASCORBIC ACID, VITAMIN C, (VITAMIN C) 1000 MG TABLET    Vitamin C 1,000 mg tablet   Take 4 tablets every day by oral route.    ATORVASTATIN (LIPITOR) 10 MG TABLET    Take 10 mg by mouth once daily.    CALCIUM CITRATE-VITAMIN D3 315-200 MG " (CITRACAL+D) 315 MG-5 MCG (200 UNIT) PER TABLET    Take 1 tablet by mouth 2 (two) times daily.    CELECOXIB (CELEBREX) 400 MG CAPSULE    Take 400 mg by mouth.    CYANOCOBALAMIN, VITAMIN B-12, 5,000 MCG SUBL    Place under the tongue.    FAMOTIDINE (PEPCID) 40 MG TABLET    Take 1 tablet (40 mg total) by mouth 2 (two) times daily.    FLUOXETINE (PROZAC) 20 MG CAPSULE    Take 20 mg by mouth once daily.    FLUOXETINE 10 MG CAPSULE    TAKE 1 CAPSULE BY MOUTH DAILY with 20 mg CAPSULE    FLUTICASONE FUROATE-VILANTEROL (BREO) 100-25 MCG/DOSE DISKUS INHALER    Inhale 1 puff into the lungs once daily.    GLUCOSAMINE-CHONDROITIN 500-400 MG TABLET    Take 1 tablet by mouth 3 (three) times daily.    HYDROCHLOROTHIAZIDE (MICROZIDE) 12.5 MG CAPSULE    Take 12.5 mg by mouth once daily.    HYDROCODONE/CHLORPHEN P-STIREX (HYDROCODONE-CHLORPHENIRAMINE ORAL)    Take 5 mL/hr by mouth every 12 (twelve) hours.    HYOSCYAMINE SULFATE/PHENOBARB (PHENOBARBITAL-HYOSCYAMINE ORAL)    Take 1.125 mg by mouth. 1.125 mg Tad Hyoscyamine and 1 15 mg Tab Phenobarbital as needed for stomach pain every 6hrs.    PANTOPRAZOLE (PROTONIX) 40 MG TABLET    Take 1 tablet (40 mg total) by mouth 2 (two) times daily.    PHENOBARBITAL 32.4 MG TABLET    TAKE 1/2 TABLET BY MOUTH 2 TIMES A DAY AS NEEDED    VITAMIN D3-VITAMIN K2 1000-90 UNIT-MCG TBDL    Take 1 tablet by mouth 2 (two) times a day.      Review of patient's allergies indicates:   Allergen Reactions    Levofloxacin     Acremonium strictum allergenic extract     Aureobasidium pullulans allergenic extract     Cat dander     House dust     Mold     Ragweed pollen     Rhizopus oryzae allergenic extract     Tree and shrub pollen       Family History   Problem Relation Name Age of Onset    Stomach cancer Neg Hx      Colon cancer Neg Hx      Pancreatic cancer Neg Hx      Liver disease Neg Hx      Throat cancer Neg Hx        Social History     Tobacco Use    Smoking status: Former     Current packs/day: 0.00      Types: Cigarettes     Quit date: 1968     Years since quittin.1    Smokeless tobacco: Never   Substance Use Topics    Alcohol use: Not Currently    Drug use: Never        Physical Examination:     General Appearance:___________________________  HEENT: _____________________________________  Abdomen:____________________________________  Heart:________________________________________  Lungs:_______________________________________  Extremities:___________________________________  Skin:_________________________________________  Endocrine:____________________________________  Genitourinary:_________________________________  Neurological:__________________________________      Patient has been evaluated immediately prior to sedation and is medically cleared for endoscopy with IVCS as an ASA class: ______      Physician Signature: _________________________       Date: ________  Time: ________

## 2025-02-17 ENCOUNTER — OUTSIDE PLACE OF SERVICE (OUTPATIENT)
Dept: GASTROENTEROLOGY | Facility: CLINIC | Age: 80
End: 2025-02-17

## 2025-02-18 ENCOUNTER — TELEPHONE (OUTPATIENT)
Dept: GASTROENTEROLOGY | Facility: CLINIC | Age: 80
End: 2025-02-18
Payer: MEDICARE

## 2025-02-18 NOTE — TELEPHONE ENCOUNTER
----- Message from Mara sent at 2/18/2025  8:07 AM CST -----  Contact: MARY GUERRERO III [[id number]]  ..Type:  Patient Requesting CallWho Called: MARY GUERRERO III [[id number]]What is the call regarding?: pt has questions about procedure done yesterday 2/17Would the patient rather a call back or a response via [facility]?  callBe Call Back Number: [phone] (home) Additional Information:

## 2025-02-18 NOTE — TELEPHONE ENCOUNTER
Patient states he has had several EGD before with no issues. Currently experiencing very dry mouth and throat, unable to swallow solids without liquid carrier. Would like to know why is it different, was something different/new done/used from the typical.

## 2025-02-23 ENCOUNTER — RESULTS FOLLOW-UP (OUTPATIENT)
Dept: GASTROENTEROLOGY | Facility: CLINIC | Age: 80
End: 2025-02-23

## 2025-02-23 NOTE — TELEPHONE ENCOUNTER
EGBx w/o IM/dysplasia.    Notify patient that his esophageal Bx looked very well.  No signs of precancerous cells or active BE.  I did see his message regarding his dry mouth/throat. I suspect it was a side effect of one of the sedatives he got for the EGD which should be resolved by now. Any residual issues/symptoms?  NBP

## 2025-02-24 NOTE — TELEPHONE ENCOUNTER
Spoke with patient and gave results, remarks and recommendations per NBP. Patient stated that the dry mouth has resolved and he is not having any issues. Patient verbalized understanding of this information. -JOELLE,LPN

## 2025-06-30 NOTE — TELEPHONE ENCOUNTER
All Patches/Pads removed. Skin Intact. Will need to confirm if he is taking pantoprazole 40mg once or twice daily? If taking once daily, will increase to BID. If already on BID, will add famotidine 40mg HS and sodium bicarb prn.    KN